# Patient Record
Sex: FEMALE | Race: WHITE | Employment: UNEMPLOYED | URBAN - METROPOLITAN AREA
[De-identification: names, ages, dates, MRNs, and addresses within clinical notes are randomized per-mention and may not be internally consistent; named-entity substitution may affect disease eponyms.]

---

## 2017-05-31 ENCOUNTER — HOSPITAL ENCOUNTER (OUTPATIENT)
Dept: GENERAL RADIOLOGY | Age: 47
Discharge: HOME | End: 2017-05-31
Attending: OBSTETRICS & GYNECOLOGY
Payer: COMMERCIAL

## 2017-05-31 ENCOUNTER — HOSPITAL ENCOUNTER (OUTPATIENT)
Dept: ULTRASOUND IMAGING | Age: 47
Discharge: HOME | End: 2017-05-31
Attending: OBSTETRICS & GYNECOLOGY
Payer: COMMERCIAL

## 2017-05-31 ENCOUNTER — HOSPITAL ENCOUNTER (OUTPATIENT)
Dept: MAMMOGRAPHY | Age: 47
Discharge: HOME | End: 2017-05-31
Attending: OBSTETRICS & GYNECOLOGY
Payer: COMMERCIAL

## 2017-05-31 DIAGNOSIS — Z12.31 VISIT FOR SCREENING MAMMOGRAM: ICD-10-CM

## 2017-05-31 DIAGNOSIS — N63.0 LUMP OR MASS IN BREAST: ICD-10-CM

## 2017-05-31 PROCEDURE — 77066 DX MAMMO INCL CAD BI: CPT

## 2017-05-31 PROCEDURE — 76642 ULTRASOUND BREAST LIMITED: CPT

## 2017-05-31 PROCEDURE — 77080 DXA BONE DENSITY AXIAL: CPT

## 2017-06-21 ENCOUNTER — HOSPITAL ENCOUNTER (OUTPATIENT)
Dept: ULTRASOUND IMAGING | Age: 47
Discharge: HOME | End: 2017-06-21
Attending: OBSTETRICS & GYNECOLOGY

## 2017-06-21 ENCOUNTER — APPOINTMENT (OUTPATIENT)
Dept: MAMMOGRAPHY | Age: 47
End: 2017-06-21
Attending: OBSTETRICS & GYNECOLOGY

## 2017-06-21 DIAGNOSIS — R92.8 ABNORMAL RADIOGRAPHIC FINDINGS IN SPECIMEN FROM FEMALE BREAST: ICD-10-CM

## 2017-06-21 RX ORDER — LIDOCAINE HYDROCHLORIDE AND EPINEPHRINE 10; 10 MG/ML; UG/ML
10 INJECTION, SOLUTION INFILTRATION; PERINEURAL
Status: DISPENSED | OUTPATIENT
Start: 2017-06-21 | End: 2017-06-21

## 2017-06-21 RX ORDER — LIDOCAINE HYDROCHLORIDE 10 MG/ML
10 INJECTION INFILTRATION; PERINEURAL
Status: DISPENSED | OUTPATIENT
Start: 2017-06-21 | End: 2017-06-21

## 2017-06-23 ENCOUNTER — HOSPITAL ENCOUNTER (OUTPATIENT)
Dept: MAMMOGRAPHY | Age: 47
Discharge: HOME | End: 2017-06-23
Attending: OBSTETRICS & GYNECOLOGY
Payer: COMMERCIAL

## 2017-06-23 ENCOUNTER — HOSPITAL ENCOUNTER (OUTPATIENT)
Dept: ULTRASOUND IMAGING | Age: 47
Discharge: HOME | End: 2017-06-23
Attending: OBSTETRICS & GYNECOLOGY
Payer: COMMERCIAL

## 2017-06-23 DIAGNOSIS — R92.8 ABNORMAL RADIOGRAPHIC FINDINGS IN SPECIMEN FROM FEMALE BREAST: ICD-10-CM

## 2017-06-23 PROCEDURE — 77030020003 US BX BREAST RT 1ST LESION W/CLIP AND SPECIMEN

## 2017-06-23 PROCEDURE — 77065 DX MAMMO INCL CAD UNI: CPT

## 2017-06-23 PROCEDURE — 77030020003 US BX BREAST VAC RT 1ST LESION W/CLIP AND SPECIMEN

## 2017-06-23 PROCEDURE — 74011000250 HC RX REV CODE- 250: Performed by: RADIOLOGY

## 2017-06-23 PROCEDURE — 88305 TISSUE EXAM BY PATHOLOGIST: CPT | Performed by: OBSTETRICS & GYNECOLOGY

## 2017-06-23 RX ORDER — LIDOCAINE HYDROCHLORIDE 10 MG/ML
10 INJECTION INFILTRATION; PERINEURAL
Status: COMPLETED | OUTPATIENT
Start: 2017-06-23 | End: 2017-06-23

## 2017-06-23 RX ORDER — LIDOCAINE HYDROCHLORIDE AND EPINEPHRINE 10; 10 MG/ML; UG/ML
10 INJECTION, SOLUTION INFILTRATION; PERINEURAL
Status: COMPLETED | OUTPATIENT
Start: 2017-06-23 | End: 2017-06-23

## 2017-06-23 RX ADMIN — LIDOCAINE HYDROCHLORIDE,EPINEPHRINE BITARTRATE 10 ML: 10; .01 INJECTION, SOLUTION INFILTRATION; PERINEURAL at 14:24

## 2017-06-23 RX ADMIN — LIDOCAINE HYDROCHLORIDE 10 ML: 10 INJECTION, SOLUTION INFILTRATION; PERINEURAL at 13:55

## 2017-08-15 ENCOUNTER — HOSPITAL ENCOUNTER (EMERGENCY)
Age: 47
Discharge: STILL ADMITTED OR ADMINISTRATIVE | End: 2017-08-15
Attending: EMERGENCY MEDICINE
Payer: MEDICAID

## 2017-08-15 PROCEDURE — 75810000275 HC EMERGENCY DEPT VISIT NO LEVEL OF CARE

## 2017-08-15 NOTE — ED PROVIDER NOTES
HPI     No past medical history on file. No past surgical history on file. No family history on file. Social History     Social History    Marital status: SINGLE     Spouse name: N/A    Number of children: N/A    Years of education: N/A     Occupational History    Not on file. Social History Main Topics    Smoking status: Not on file    Smokeless tobacco: Not on file    Alcohol use Not on file    Drug use: Not on file    Sexual activity: Not on file     Other Topics Concern    Not on file     Social History Narrative         ALLERGIES: Review of patient's allergies indicates not on file. Review of Systems    There were no vitals filed for this visit.          Physical Exam     MDM  ED Course       Procedures

## 2018-03-16 ENCOUNTER — HOSPITAL ENCOUNTER (EMERGENCY)
Age: 48
Discharge: STILL ADMITTED OR ADMINISTRATIVE | End: 2018-03-16
Attending: EMERGENCY MEDICINE
Payer: MEDICAID

## 2018-03-16 PROCEDURE — 75810000275 HC EMERGENCY DEPT VISIT NO LEVEL OF CARE

## 2018-06-12 ENCOUNTER — HOSPITAL ENCOUNTER (OUTPATIENT)
Dept: MAMMOGRAPHY | Age: 48
Discharge: HOME | End: 2018-06-12
Attending: OBSTETRICS & GYNECOLOGY
Payer: COMMERCIAL

## 2018-06-12 DIAGNOSIS — Z12.31 VISIT FOR SCREENING MAMMOGRAM: ICD-10-CM

## 2018-06-12 PROCEDURE — 77067 SCR MAMMO BI INCL CAD: CPT

## 2019-02-19 ENCOUNTER — HOSPITAL ENCOUNTER (EMERGENCY)
Age: 49
Discharge: HOME | End: 2019-02-19
Attending: EMERGENCY MEDICINE
Payer: MEDICAID

## 2019-02-19 VITALS
HEIGHT: 65 IN | RESPIRATION RATE: 17 BRPM | OXYGEN SATURATION: 100 % | BODY MASS INDEX: 19.83 KG/M2 | WEIGHT: 119 LBS | DIASTOLIC BLOOD PRESSURE: 70 MMHG | TEMPERATURE: 98.1 F | SYSTOLIC BLOOD PRESSURE: 125 MMHG | HEART RATE: 128 BPM

## 2019-02-19 DIAGNOSIS — N39.0 ACUTE UTI: Primary | ICD-10-CM

## 2019-02-19 DIAGNOSIS — R79.89 ELEVATED LFTS: ICD-10-CM

## 2019-02-19 LAB
ALBUMIN SERPL-MCNC: 4.7 G/DL (ref 3.4–5)
ALBUMIN/GLOB SERPL: 1.6 {RATIO} (ref 0.8–1.7)
ALP SERPL-CCNC: 162 U/L (ref 45–117)
ALT SERPL-CCNC: 235 U/L (ref 13–56)
ANION GAP SERPL CALC-SCNC: 12 MMOL/L (ref 3–18)
APPEARANCE UR: ABNORMAL
AST SERPL-CCNC: 210 U/L (ref 15–37)
BACTERIA URNS QL MICRO: ABNORMAL /HPF
BASOPHILS # BLD: 0 K/UL (ref 0–0.1)
BASOPHILS NFR BLD: 1 % (ref 0–2)
BILIRUB SERPL-MCNC: 1.3 MG/DL (ref 0.2–1)
BILIRUB UR QL: ABNORMAL
BUN SERPL-MCNC: 10 MG/DL (ref 7–18)
BUN/CREAT SERPL: 16 (ref 12–20)
CALCIUM SERPL-MCNC: 10.1 MG/DL (ref 8.5–10.1)
CHLORIDE SERPL-SCNC: 96 MMOL/L (ref 100–108)
CO2 SERPL-SCNC: 27 MMOL/L (ref 21–32)
COLOR UR: ABNORMAL
CREAT SERPL-MCNC: 0.61 MG/DL (ref 0.6–1.3)
DIFFERENTIAL METHOD BLD: ABNORMAL
EOSINOPHIL # BLD: 0 K/UL (ref 0–0.4)
EOSINOPHIL NFR BLD: 0 % (ref 0–5)
EPITH CASTS URNS QL MICRO: ABNORMAL /LPF (ref 0–5)
ERYTHROCYTE [DISTWIDTH] IN BLOOD BY AUTOMATED COUNT: 13.2 % (ref 11.6–14.5)
GLOBULIN SER CALC-MCNC: 2.9 G/DL (ref 2–4)
GLUCOSE SERPL-MCNC: 112 MG/DL (ref 74–99)
GLUCOSE UR STRIP.AUTO-MCNC: NEGATIVE MG/DL
HCT VFR BLD AUTO: 39.7 % (ref 35–45)
HGB BLD-MCNC: 13.8 G/DL (ref 12–16)
HGB UR QL STRIP: ABNORMAL
HYALINE CASTS URNS QL MICRO: ABNORMAL /LPF (ref 0–2)
KETONES UR QL STRIP.AUTO: 160 MG/DL
LACTATE BLD-SCNC: 1.75 MMOL/L (ref 0.4–2)
LEUKOCYTE ESTERASE UR QL STRIP.AUTO: ABNORMAL
LYMPHOCYTES # BLD: 0.7 K/UL (ref 0.9–3.6)
LYMPHOCYTES NFR BLD: 13 % (ref 21–52)
MCH RBC QN AUTO: 36.4 PG (ref 24–34)
MCHC RBC AUTO-ENTMCNC: 34.8 G/DL (ref 31–37)
MCV RBC AUTO: 104.7 FL (ref 74–97)
MONOCYTES # BLD: 0.4 K/UL (ref 0.05–1.2)
MONOCYTES NFR BLD: 7 % (ref 3–10)
MUCOUS THREADS URNS QL MICRO: ABNORMAL /LPF
NEUTS SEG # BLD: 4.4 K/UL (ref 1.8–8)
NEUTS SEG NFR BLD: 79 % (ref 40–73)
NITRITE UR QL STRIP.AUTO: POSITIVE
PH UR STRIP: 7 [PH] (ref 5–8)
PLATELET # BLD AUTO: 132 K/UL (ref 135–420)
PMV BLD AUTO: 9.8 FL (ref 9.2–11.8)
POTASSIUM SERPL-SCNC: 4.4 MMOL/L (ref 3.5–5.5)
PROT SERPL-MCNC: 7.6 G/DL (ref 6.4–8.2)
PROT UR STRIP-MCNC: >300 MG/DL
RBC # BLD AUTO: 3.79 M/UL (ref 4.2–5.3)
RBC #/AREA URNS HPF: ABNORMAL /HPF (ref 0–5)
SODIUM SERPL-SCNC: 135 MMOL/L (ref 136–145)
SP GR UR REFRACTOMETRY: 1.02 (ref 1–1.03)
UROBILINOGEN UR QL STRIP.AUTO: 2 EU/DL (ref 0.2–1)
WBC # BLD AUTO: 5.5 K/UL (ref 4.6–13.2)
WBC URNS QL MICRO: ABNORMAL /HPF (ref 0–4)

## 2019-02-19 PROCEDURE — 87086 URINE CULTURE/COLONY COUNT: CPT

## 2019-02-19 PROCEDURE — 80053 COMPREHEN METABOLIC PANEL: CPT

## 2019-02-19 PROCEDURE — 96361 HYDRATE IV INFUSION ADD-ON: CPT

## 2019-02-19 PROCEDURE — 99284 EMERGENCY DEPT VISIT MOD MDM: CPT

## 2019-02-19 PROCEDURE — 74011250636 HC RX REV CODE- 250/636: Performed by: EMERGENCY MEDICINE

## 2019-02-19 PROCEDURE — 96374 THER/PROPH/DIAG INJ IV PUSH: CPT

## 2019-02-19 PROCEDURE — 74011250637 HC RX REV CODE- 250/637: Performed by: EMERGENCY MEDICINE

## 2019-02-19 PROCEDURE — 85025 COMPLETE CBC W/AUTO DIFF WBC: CPT

## 2019-02-19 PROCEDURE — 83605 ASSAY OF LACTIC ACID: CPT

## 2019-02-19 PROCEDURE — 87040 BLOOD CULTURE FOR BACTERIA: CPT

## 2019-02-19 PROCEDURE — 81001 URINALYSIS AUTO W/SCOPE: CPT

## 2019-02-19 RX ORDER — ONDANSETRON 2 MG/ML
4 INJECTION INTRAMUSCULAR; INTRAVENOUS
Status: COMPLETED | OUTPATIENT
Start: 2019-02-19 | End: 2019-02-19

## 2019-02-19 RX ORDER — GLUCOSAMINE/CHONDR SU A SOD 750-600 MG
TABLET ORAL
COMMUNITY
End: 2019-04-05

## 2019-02-19 RX ORDER — CEPHALEXIN 250 MG/1
500 CAPSULE ORAL
Status: COMPLETED | OUTPATIENT
Start: 2019-02-19 | End: 2019-02-19

## 2019-02-19 RX ORDER — ONDANSETRON 4 MG/1
TABLET, ORALLY DISINTEGRATING ORAL
Qty: 10 TAB | Refills: 0 | Status: SHIPPED | OUTPATIENT
Start: 2019-02-19 | End: 2019-04-05

## 2019-02-19 RX ORDER — CEPHALEXIN 500 MG/1
500 CAPSULE ORAL 2 TIMES DAILY
Qty: 14 CAP | Refills: 0 | Status: SHIPPED | OUTPATIENT
Start: 2019-02-19 | End: 2019-02-26

## 2019-02-19 RX ADMIN — ONDANSETRON 4 MG: 2 INJECTION INTRAMUSCULAR; INTRAVENOUS at 12:22

## 2019-02-19 RX ADMIN — SODIUM CHLORIDE 1000 ML: 900 INJECTION, SOLUTION INTRAVENOUS at 11:04

## 2019-02-19 RX ADMIN — CEPHALEXIN 500 MG: 250 CAPSULE ORAL at 12:22

## 2019-02-19 NOTE — ED PROVIDER NOTES
EMERGENCY DEPARTMENT HISTORY AND PHYSICAL EXAM 
 
10:43 AM 
 
 
Date: 2/19/2019 Patient Name: Sujit Hunter History of Presenting Illness Chief Complaint Patient presents with  Urinary Pain  Urinary Frequency History Provided By: Patient Additional History (Context): Sujit Hunter is a 44 y.o. female with skin cancer who presents with intermittent dysuria for the past 2 weeks. Associated sx are  vomiting (past few days), abd pain, and back pain. Denies hematuria, diarrhea, and fever. She has tried drinking water and juice with no relief of sx. She has had bladder infections in the past. No meds everyday. Has had a C section. Smokes 1 pack a day and drinks a few times a week. She appears shaky. PCP: None Chief Complaint: dysuria Duration:  2 weeks Timing:  Intermittent Location:  Quality: Burning Severity: 9 out of 10 Modifying Factors: She has tried drinking water and Juice with no relief of sx. Associated Symptoms: vomiting (past few days), abd pain, and back pain. Denies hematuria, diarrhea, and fever. Current Facility-Administered Medications Medication Dose Route Frequency Provider Last Rate Last Dose  sodium chloride 0.9 % bolus infusion 1,000 mL  1,000 mL IntraVENous ONCE Negra Tam DO 1,000 mL/hr at 02/19/19 1104 1,000 mL at 02/19/19 1104  cephALEXin (KEFLEX) capsule 500 mg  500 mg Oral NOW Shelley Ahumada DO      
 
Current Outpatient Medications Medication Sig Dispense Refill  Biotin 2,500 mcg cap Take  by mouth.  cephALEXin (KEFLEX) 500 mg capsule Take 1 Cap by mouth two (2) times a day for 7 days. 14 Cap 0 Past History Past Medical History: 
History reviewed. No pertinent past medical history. Past Surgical History: 
Past Surgical History:  
Procedure Laterality Date  HX BREAST BIOPSY Right 06/23/2017 Benign Family History: 
History reviewed. No pertinent family history. Social History: Social History Tobacco Use  Smoking status: Current Every Day Smoker  Smokeless tobacco: Never Used Substance Use Topics  Alcohol use: Not on file  Drug use: Not on file Allergies: Allergies Allergen Reactions  Bactrim [Sulfamethoprim] Rash Review of Systems Review of Systems Constitutional: Negative for fever. Gastrointestinal: Positive for abdominal pain and vomiting. Negative for diarrhea. Genitourinary: Positive for dysuria. Negative for hematuria. Musculoskeletal: Positive for back pain. All other systems reviewed and are negative. Physical Exam  
 
Visit Vitals /78 Pulse (!) 128 Temp 98.1 °F (36.7 °C) Resp 17 Ht 5' 5\" (1.651 m) Wt 54 kg (119 lb) SpO2 96% BMI 19.80 kg/m² Physical Exam  
Constitutional: She is oriented to person, place, and time. She appears well-developed and well-nourished. tremulous HENT:  
Head: Normocephalic and atraumatic. Neck: Neck supple. No JVD present. Cardiovascular: Regular rhythm. Tachycardia present. Pulmonary/Chest: Effort normal and breath sounds normal. No respiratory distress. Abdominal: Soft. She exhibits no distension. There is tenderness in the suprapubic area. There is no rebound, no guarding and no CVA tenderness. Musculoskeletal: She exhibits no edema. No joint tenderness Neurological: She is alert and oriented to person, place, and time. Skin: Skin is warm and dry. No erythema. Psychiatric: Judgment normal.  
 
 
 
Diagnostic Study Results Labs - Recent Results (from the past 12 hour(s)) URINALYSIS W/ RFLX MICROSCOPIC Collection Time: 02/19/19 10:22 AM  
Result Value Ref Range Color DARK YELLOW Appearance HAZY Specific gravity 1.020 1.003 - 1.030    
 pH (UA) 7.0 5.0 - 8.0 Protein >300 (A) NEG mg/dL Glucose NEGATIVE  NEG mg/dL Ketone 160 (A) NEG mg/dL Bilirubin LARGE (A) NEG  Blood MODERATE (A) NEG    
 Urobilinogen 2.0 (H) 0.2 - 1.0 EU/dL Nitrites POSITIVE (A) NEG Leukocyte Esterase SMALL (A) NEG URINE MICROSCOPIC ONLY Collection Time: 02/19/19 10:22 AM  
Result Value Ref Range WBC 21 to 35 0 - 4 /hpf  
 RBC 11 to 20 0 - 5 /hpf Epithelial cells 4+ 0 - 5 /lpf Bacteria 3+ (A) NEG /hpf Mucus 2+ (A) NEG /lpf Hyaline cast 4 to 10 0 - 2 /lpf  
CBC WITH AUTOMATED DIFF Collection Time: 02/19/19 11:00 AM  
Result Value Ref Range WBC 5.5 4.6 - 13.2 K/uL  
 RBC 3.79 (L) 4.20 - 5.30 M/uL  
 HGB 13.8 12.0 - 16.0 g/dL HCT 39.7 35.0 - 45.0 % .7 (H) 74.0 - 97.0 FL  
 MCH 36.4 (H) 24.0 - 34.0 PG  
 MCHC 34.8 31.0 - 37.0 g/dL  
 RDW 13.2 11.6 - 14.5 % PLATELET 125 (L) 471 - 420 K/uL MPV 9.8 9.2 - 11.8 FL  
 NEUTROPHILS 79 (H) 40 - 73 % LYMPHOCYTES 13 (L) 21 - 52 % MONOCYTES 7 3 - 10 % EOSINOPHILS 0 0 - 5 % BASOPHILS 1 0 - 2 %  
 ABS. NEUTROPHILS 4.4 1.8 - 8.0 K/UL  
 ABS. LYMPHOCYTES 0.7 (L) 0.9 - 3.6 K/UL  
 ABS. MONOCYTES 0.4 0.05 - 1.2 K/UL  
 ABS. EOSINOPHILS 0.0 0.0 - 0.4 K/UL  
 ABS. BASOPHILS 0.0 0.0 - 0.1 K/UL  
 DF AUTOMATED METABOLIC PANEL, COMPREHENSIVE Collection Time: 02/19/19 11:00 AM  
Result Value Ref Range Sodium 135 (L) 136 - 145 mmol/L Potassium 4.4 3.5 - 5.5 mmol/L Chloride 96 (L) 100 - 108 mmol/L  
 CO2 27 21 - 32 mmol/L Anion gap 12 3.0 - 18 mmol/L Glucose 112 (H) 74 - 99 mg/dL BUN 10 7.0 - 18 MG/DL Creatinine 0.61 0.6 - 1.3 MG/DL  
 BUN/Creatinine ratio 16 12 - 20 GFR est AA >60 >60 ml/min/1.73m2 GFR est non-AA >60 >60 ml/min/1.73m2 Calcium 10.1 8.5 - 10.1 MG/DL Bilirubin, total 1.3 (H) 0.2 - 1.0 MG/DL  
 ALT (SGPT) 235 (H) 13 - 56 U/L  
 AST (SGOT) 210 (H) 15 - 37 U/L Alk. phosphatase 162 (H) 45 - 117 U/L Protein, total 7.6 6.4 - 8.2 g/dL Albumin 4.7 3.4 - 5.0 g/dL Globulin 2.9 2.0 - 4.0 g/dL A-G Ratio 1.6 0.8 - 1.7 POC LACTIC ACID  Collection Time: 02/19/19 11:09 AM  
 Result Value Ref Range Lactic Acid (POC) 1.75 0.40 - 2.00 mmol/L Radiologic Studies - No orders to display Medical Decision Making It should be noted that ICarina DO will be the provider of record for this patient. I reviewed the vital signs, available nursing notes, past medical history, past surgical history, family history and social history. Vital Signs-Reviewed the patient's vital signs. Pulse Oximetry Analysis -  98% on room air Records Reviewed: Nursing Notes (Time of Review: 10:43 AM) ED Course: Progress Notes, Reevaluation, and Consults: 
 
Provider Notes (Medical Decision Making): 43 y/o female presents with dysuria and abd pain, noted tachycardia, appears anxious but will screen for sepsis with lactate, cultures, labs. Mild suprapubic tenderness, will consider imaging if workup neg. abd soft, non-surgical.  
 
11:48 AM 
Discussed results with pt, noted elevated LFTs. Discussed importance of pcp follow up for repeat testing. Remainder of workup unremarkable other than uti, will treat. Diagnosis Clinical Impression: 1. Acute UTI 2. Elevated LFTs Disposition: discharged Follow-up Information Follow up With Specialties Details Why Contact Info Barbara Ro MD Family Practice, Internal Medicine Go [place] 2 [place] [id number] [facility] [phone] [place] [id number] [phone] Dudley Recio MD Internal Medicine Go in 2 days For Follow up [id number] [facility] [address] [place] [id number] [phone] [facility] EMERGENCY DEPT Emergency Medicine Go to As needed, If symptoms worsen [address] 
[phone] Medication List  
  
START taking these medications   
cephALEXin 500 mg capsule Commonly known as:  Wyatt Irwin Take 1 Cap by mouth two (2) times a day for 7 days. ASK your doctor about these medications Biotin 2,500 mcg Cap Where to Get Your Medications These medications were sent to Nataly Bergeron at [facility] [address], [address] [place] [address], [facility] [id number] Phone:  [phone] · cephALEXin 500 mg capsule 
  
 
_______________________________ Scribe Attestation Reyes Doheny acting as a scribe for and in the presence of Joby Cobian DO February 19, 2019 at 10:48 AM 
    
Provider Attestation:     
I personally performed the services described in the documentation, reviewed the documentation, as recorded by the scribe in my presence, and it accurately and completely records my words and actions. February 19, 2019 at 10:48 AM - Joby Cobian DO   
 
 
_______________________________

## 2019-02-19 NOTE — DISCHARGE INSTRUCTIONS
Patient Education        Liver Function Tests: About These Tests  What is it? Liver function tests check how well your liver is working. Some tests measure the amount of certain enzymes in your blood to check whether the liver is damaged or inflamed. Other tests measure how well the liver can make important proteins or clear waste products from the body. Your doctor will use the test results to help look for certain conditions, such as hepatitis, cirrhosis, or gallbladder problems. Test results that are not normal do not always mean there is a problem with your liver. Your doctor can determine if there is a problem based on your results. The tests may include:  · Alkaline phosphatase (ALP). This test measures the amount of the enzyme ALP in your blood. · Total protein. A total serum protein test measures the amounts of two major groups of proteins in your blood (albumin and globulin) and the total amount of protein. · Bilirubin. This test measures the amount of bilirubin in your blood. When bilirubin levels are high, the skin and whites of the eyes may appear yellow (jaundice). This may be caused by liver disease. · Aspartate aminotransferase (AST) and alanine aminotransferase (ALT). These tests measure the amount of the enzymes AST and ALT in your blood. (Aminotransferase is also known as a transaminase. High levels may be called transaminitis.)  Why is this test done? Liver function tests check how well your liver is working. Some tests help show whether your liver is damaged or inflamed. Your doctor may order liver function tests if you have symptoms of liver disease. These tests also may be done to see how well a treatment for liver disease is working. How can you prepare for the test?  In general, you do not need to prepare before having these tests. Your doctor may give you some specific instructions to prepare.   What happens during the test?  A health professional takes a sample of your blood.  What happens after the test?  You will probably be able to go home right away. When should you call for help? Watch closely for changes in your health, and be sure to contact your doctor if you have any problems. Follow-up care is a key part of your treatment and safety. Be sure to make and go to all appointments, and call your doctor if you are having problems. It's also a good idea to keep a list of the medicines you take. Ask your doctor when you can expect to have your test results. Where can you learn more? Go to http://juan-nayla.info/. Enter L069 in the search box to learn more about \"Liver Function Tests: About These Tests. \"  Current as of: June 25, 2018  Content Version: 11.9  © 2006-2018 [employer]. Care instructions adapted under license by [employer] (which disclaims liability or warranty for this information). If you have questions about a medical condition or this instruction, always ask your healthcare professional. [place] [address] any warranty or liability for your use of this information. Patient Education        Urinary Tract Infection in Women: Care Instructions  Your Care Instructions    A urinary tract infection, or UTI, is a general term for an infection anywhere between the kidneys and the urethra (where urine comes out). Most UTIs are bladder infections. They often cause pain or burning when you urinate. UTIs are caused by bacteria and can be cured with antibiotics. Be sure to complete your treatment so that the infection goes away. Follow-up care is a key part of your treatment and safety. Be sure to make and go to all appointments, and call your doctor if you are having problems. It's also a good idea to know your test results and keep a list of the medicines you take. How can you care for yourself at home? · Take your antibiotics as directed. Do not stop taking them just because you feel better.  You need to take the full course of antibiotics. · Drink extra water and other fluids for the next day or two. This may help wash out the bacteria that are causing the infection. (If you have kidney, heart, or liver disease and have to limit fluids, talk with your doctor before you increase your fluid intake.)  · Avoid drinks that are carbonated or have caffeine. They can irritate the bladder. · Urinate often. Try to empty your bladder each time. · To relieve pain, take a hot bath or lay a heating pad set on low over your lower belly or genital area. Never go to sleep with a heating pad in place. To prevent UTIs  · Drink plenty of water each day. This helps you urinate often, which clears bacteria from your system. (If you have kidney, heart, or liver disease and have to limit fluids, talk with your doctor before you increase your fluid intake.)  · Urinate when you need to. · Urinate right after you have sex. · Change sanitary pads often. · Avoid douches, bubble baths, feminine hygiene sprays, and other feminine hygiene products that have deodorants. · After going to the bathroom, wipe from front to back. When should you call for help? Call your doctor now or seek immediate medical care if:    · Symptoms such as fever, chills, nausea, or vomiting get worse or appear for the first time.     · You have new pain in your back just below your rib cage. This is called flank pain.     · There is new blood or pus in your urine.     · You have any problems with your antibiotic medicine.    Watch closely for changes in your health, and be sure to contact your doctor if:    · You are not getting better after taking an antibiotic for 2 days.     · Your symptoms go away but then come back. Where can you learn more? Go to http://juan-nayla.info/. Enter T388 in the search box to learn more about \"Urinary Tract Infection in Women: Care Instructions. \"  Current as of: March 20, 2018  Content Version: 11.9  © 5644-0881 [employer]. Care instructions adapted under license by [employer] (which disclaims liability or warranty for this information). If you have questions about a medical condition or this instruction, always ask your healthcare professional. [address] any warranty or liability for your use of this information.

## 2019-02-19 NOTE — ED TRIAGE NOTES
Frequency pain and urgency voiding. X 2 weeks. tx w/ OTC meds w/ no relief. Took motrin and vomite brown emesis

## 2019-02-21 LAB
BACTERIA SPEC CULT: NORMAL
SERVICE CMNT-IMP: NORMAL

## 2019-02-25 LAB
BACTERIA SPEC CULT: NORMAL
BACTERIA SPEC CULT: NORMAL
SERVICE CMNT-IMP: NORMAL
SERVICE CMNT-IMP: NORMAL

## 2019-04-05 ENCOUNTER — OFFICE VISIT (OUTPATIENT)
Dept: FAMILY MEDICINE CLINIC | Age: 49
End: 2019-04-05

## 2019-04-05 VITALS
DIASTOLIC BLOOD PRESSURE: 92 MMHG | BODY MASS INDEX: 18.03 KG/M2 | HEIGHT: 65 IN | TEMPERATURE: 97.9 F | SYSTOLIC BLOOD PRESSURE: 145 MMHG | OXYGEN SATURATION: 98 % | RESPIRATION RATE: 20 BRPM | HEART RATE: 121 BPM | WEIGHT: 108.2 LBS

## 2019-04-05 DIAGNOSIS — F10.10 ALCOHOL USE DISORDER, MILD, ABUSE: ICD-10-CM

## 2019-04-05 DIAGNOSIS — F17.200 TOBACCO USE DISORDER: ICD-10-CM

## 2019-04-05 DIAGNOSIS — M79.675 PAIN OF LEFT GREAT TOE: Primary | ICD-10-CM

## 2019-04-05 DIAGNOSIS — Z13.6 ENCOUNTER FOR LIPID SCREENING FOR CARDIOVASCULAR DISEASE: ICD-10-CM

## 2019-04-05 DIAGNOSIS — Z13.220 ENCOUNTER FOR LIPID SCREENING FOR CARDIOVASCULAR DISEASE: ICD-10-CM

## 2019-04-05 DIAGNOSIS — Z13.1 SCREENING FOR DIABETES MELLITUS: ICD-10-CM

## 2019-04-05 RX ORDER — NAPROXEN 500 MG/1
500 TABLET ORAL 2 TIMES DAILY WITH MEALS
Qty: 14 TAB | Refills: 0 | Status: SHIPPED | OUTPATIENT
Start: 2019-04-05 | End: 2019-04-12

## 2019-04-05 RX ORDER — IBUPROFEN 200 MG
1 TABLET ORAL EVERY 24 HOURS
Qty: 30 PATCH | Refills: 0 | Status: SHIPPED | OUTPATIENT
Start: 2019-04-05 | End: 2019-12-10

## 2019-04-05 RX ORDER — VARENICLINE TARTRATE 25 MG
KIT ORAL
Qty: 1 DOSE PACK | Refills: 0 | Status: SHIPPED | OUTPATIENT
Start: 2019-04-05 | End: 2019-10-02 | Stop reason: SDUPTHER

## 2019-04-05 RX ORDER — PAROXETINE 7.5 MG/1
CAPSULE ORAL
COMMUNITY
End: 2019-04-05

## 2019-04-05 NOTE — PROGRESS NOTES
History of Present Illness Surya Fisher is a 44 y.o. female who presents today for management of 
 
Chief Complaint Patient presents with [facility] Establish Care  Toe Pain Patient is here to establish care. Previous PCP: none Foot pain Patient complains of left foot pain, localized to the great toe. Onset of the symptoms was 2 weeks ago. Inciting event: none known. Current symptoms include bruising and pain. Aggravating symptoms: any weight bearing. Patient's overall course: stable. Patient has had no prior foot problems. Previous visits for this problem: none. Evaluation to date: none. Treatment to date: none. Past Medical History Past Medical History:  
Diagnosis Date  Fibroadenoma of breast, right  Malignant melanoma of forehead ([address] Surgical History Past Surgical History:  
Procedure Laterality Date  HX BREAST BIOPSY Right 2017 Benign  HX  SECTION Current Medications Current Outpatient Medications Medication Sig  varenicline (CHANTIX STARTING MONTH BOX) 0.5 mg (11)- 1 mg (42) DsPk Per Dose pack instructions  nicotine (NICODERM CQ) 21 mg/24 hr 1 Patch by TransDERmal route every twenty-four (24) hours.  naproxen (NAPROSYN) 500 mg tablet Take 1 Tab by mouth two (2) times daily (with meals) for 7 days. No current facility-administered medications for this visit. Allergies/Drug Reactions Allergies Allergen Reactions  Bactrim [Sulfamethoprim] Rash  Penicillins Rash and Other (comments) Family History Family History Problem Relation Age of Onset  Dementia Father  No Known Problems Sister  No Known Problems Brother Social History Social History Socioeconomic History  Marital status: LEGALLY  Spouse name: Not on file  Number of children: Not on file  Years of education: Not on file  Highest education level: Not on file Occupational History  Not on file Social Needs  Financial resource strain: Not on file  Food insecurity:  
  Worry: Not on file Inability: Not on file  Transportation needs:  
  Medical: Not on file Non-medical: Not on file Tobacco Use  Smoking status: Current Every Day Smoker Packs/day: 1.00 Years: 14.00 Pack years: 14.00 Types: Cigarettes  Smokeless tobacco: Never Used Substance and Sexual Activity  Alcohol use: Yes Frequency: 4 or more times a week  Drug use: Never  Sexual activity: Not Currently Lifestyle  Physical activity:  
  Days per week: Not on file Minutes per session: Not on file  Stress: Not on file Relationships  Social connections:  
  Talks on phone: Not on file Gets together: Not on file Attends Sikhism service: Not on file Active member of club or organization: Not on file Attends meetings of clubs or organizations: Not on file Relationship status: Not on file  Intimate partner violence:  
  Fear of current or ex partner: Not on file Emotionally abused: Not on file Physically abused: Not on file Forced sexual activity: Not on file Other Topics Concern  Not on file Social History Narrative  Not on file Health Maintenance Topic Date Due  Pneumococcal 0-64 years (1 of 1 - PPSV23) 01/27/1974  
 DTaP/Tdap/Td series (1 - Tdap) 01/27/1989  PAP AKA CERVICAL CYTOLOGY  01/27/1989  Shingrix Vaccine Age 50> (1 of 2) 01/27/2018  FOBT Q 1 YEAR AGE 50-75  01/27/2018  Influenza Age 5 to Adult  08/01/2019  BREAST CANCER SCRN MAMMOGRAM  06/12/2020  Bone Densitometry (Dexa) Screening  01/27/2033 Review of Systems Negative except as mentioned in HPI Physical Exam 
Vital signs:  
Vitals:  
 04/05/19 1248 BP: (!) 145/92 Pulse: (!) 121 Resp: 20 Temp: 97.9 °F (36.6 °C) TempSrc: Oral  
SpO2: 98% Weight: 108 lb 3.2 oz (49.1 kg) Height: 5' 5\" (1.651 m) General: alert, oriented, not in distress Head: scalp normal, atraumatic Extremities: (+) mild erythema over the tip of the left big toe, (+) TTP, no swelling, no foreign body or mass noted, DP pulses full and equal, no focal deformities, no edema Skin: no active skin lesions Laboratory/Tests: 
Component Latest Ref Rng & Units 2/19/2019 2/19/2019 2/19/2019 2/19/2019  
 
     11:00 AM 11:00 AM 10:22 AM 10:22 AM  
WBC 
    4.6 - 13.2 K/uL  5.5    
RBC 
    4.20 - 5.30 M/uL  3.79 (L) HGB 
    12.0 - 16.0 g/dL  13.8 HCT 
    35.0 - 45.0 %  39.7 MCV 
    74.0 - 97.0 FL  104.7 (H) MCH 
    24.0 - 34.0 PG  36.4 (H) MCHC 31.0 - 37.0 g/dL  34.8    
RDW 
    11.6 - 14.5 %  13.2 PLATELET 
    363 - 909 K/uL  132 (L) MPV 
    9.2 - 11.8 FL  9.8 NEUTROPHILS 
    40 - 73 %  79 (H) LYMPHOCYTES 
    21 - 52 %  13 (L) MONOCYTES 
    3 - 10 %  7 EOSINOPHILS 
    0 - 5 %  0    
BASOPHILS 
    0 - 2 %  1    
ABS. NEUTROPHILS 
    1.8 - 8.0 K/UL  4.4    
ABS. LYMPHOCYTES 
    0.9 - 3.6 K/UL  0.7 (L)    
ABS. MONOCYTES 
    0.05 - 1.2 K/UL  0.4    
ABS. EOSINOPHILS 
    0.0 - 0.4 K/UL  0.0    
ABS. BASOPHILS 
    0.0 - 0.1 K/UL  0.0    
DF AUTOMATED Sodium 136 - 145 mmol/L 135 (L) Potassium 3.5 - 5.5 mmol/L 4.4 Chloride 100 - 108 mmol/L 96 (L)     
CO2 
    21 - 32 mmol/L 27 Anion gap 3.0 - 18 mmol/L 12 Glucose 74 - 99 mg/dL 112 (H) BUN 
    7.0 - 18 MG/DL 10 Creatinine 
    0.6 - 1.3 MG/DL 0.61     
BUN/Creatinine ratio 12 - 20   16 GFR est AA 
    >60 ml/min/1.73m2 >60     
GFR est non-AA 
    >60 ml/min/1.73m2 >60 Calcium 8.5 - 10.1 MG/DL 10.1 Bilirubin, total 
    0.2 - 1.0 MG/DL 1.3 (H) ALT (SGPT) 13 - 56 U/L 235 (H) AST 
    15 - 37 U/L 210 (H) Alk. phosphatase 45 - 117 U/L 162 (H) Protein, total 
    6.4 - 8.2 g/dL 7.6 Albumin 3.4 - 5.0 g/dL 4.7 Globulin 2.0 - 4.0 g/dL 2.9 A-G Ratio 
    0.8 - 1.7   1.6 Color DARK YELLOW Appearance HAZY Specific gravity 1.003 - 1.030      1.020  
pH (UA) 
    5.0 - 8.0      7.0 Protein NEG mg/dL    >300 (A) Glucose NEG mg/dL    NEGATIVE Ketone NEG mg/dL    160 (A) Bilirubin NEG      LARGE (A) Blood NEG      MODERATE (A) Urobilinogen 0.2 - 1.0 EU/dL    2.0 (H) Nitrites NEG      POSITIVE (A) Leukocyte Esterase NEG      SMALL (A) WBC 
    0 - 4 /hpf   21 to 35 RBC 
    0 - 5 /hpf   11 to 20 Epithelial cells 0 - 5 /lpf   4+ Bacteria NEG /hpf   3+ (A) Mucus NEG /lpf   2+ (A) Hyaline cast 
    0 - 2 /lpf   4 to 10 Assessment/Plan: 1. Pain of left great toe 
- ? Gout vs cellulitis vs trauma 
- naproxen (NAPROSYN) 500 mg tablet; Take 1 Tab by mouth two (2) times daily (with meals) for 7 days. Dispense: 14 Tab; Refill: 0 
- XR FOOT LT MIN 3 V; Future 2. Tobacco use disorder 
- varenicline (CHANTIX STARTING MONTH BOX) 0.5 mg (11)- 1 mg (42) DsPk; Per Dose pack instructions  Dispense: 1 Dose Pack; Refill: 0 
- nicotine (NICODERM CQ) 21 mg/24 hr; 1 Patch by TransDERmal route every twenty-four (24) hours. Dispense: 30 Patch; Refill: 0 
 
3. Encounter for lipid screening for cardiovascular disease - LIPID PANEL; Future 4. Screening for diabetes mellitus 
- HEMOGLOBIN A1C WITH EAG; Future 5. Alcohol use disorder, mild 
- advised to limit alcohol to one glass of wine per day Follow-up and Dispositions · Return in about 1 month (around 5/3/2019) for annual physical exam. 
  
 
 
 
I have discussed the diagnosis with the patient and the intended plan as seen in the above orders. The patient has received an after-visit summary and questions were answered concerning future plans.   I have discussed medication side effects and warnings with the patient as well. I have reviewed the plan of care with the patient, accepted their input and they are in agreement with the treatment goals. Ketan Zimmer MD 
April 5, 2019

## 2019-04-05 NOTE — PROGRESS NOTES
1. Have you been to the ER, urgent care clinic since your last visit? Hospitalized since your last visit? No 
 
2. Have you seen or consulted any other health care providers outside of the [address] [place] [address] since your last visit? Include any pap smears or colon screening.  No

## 2019-04-07 LAB
CHOLEST SERPL-MCNC: 350 MG/DL (ref 100–199)
EST. AVERAGE GLUCOSE BLD GHB EST-MCNC: 80 MG/DL
HBA1C MFR BLD: 4.4 % (ref 4.8–5.6)
HDLC SERPL-MCNC: 181 MG/DL
INTERPRETATION, 910389: NORMAL
LDLC SERPL CALC-MCNC: 151 MG/DL (ref 0–99)
TRIGL SERPL-MCNC: 90 MG/DL (ref 0–149)
VLDLC SERPL CALC-MCNC: 18 MG/DL (ref 5–40)

## 2019-06-06 ENCOUNTER — TELEPHONE (OUTPATIENT)
Dept: FAMILY MEDICINE CLINIC | Age: 49
End: 2019-06-06

## 2019-06-06 DIAGNOSIS — E78.00 HYPERCHOLESTEROLEMIA: Primary | ICD-10-CM

## 2019-06-06 RX ORDER — ATORVASTATIN CALCIUM 20 MG/1
20 TABLET, FILM COATED ORAL DAILY
Qty: 90 TAB | Refills: 1 | Status: SHIPPED | OUTPATIENT
Start: 2019-06-06 | End: 2020-03-02 | Stop reason: SINTOL

## 2019-06-06 NOTE — TELEPHONE ENCOUNTER
Spoke with patient, patient advised that labs noted to be discussed at the time of her appointment. Pt states she is not feeling well and her gout is flaring. Per Dr. Edna Lennon, pt should take Lipitor 20 mg once daily and seek immediate attention for gout at urgent care. Nurse offered appt with another provider,pt declined by stating no she is happy with her current provider. Pt verbalized understanding, this encounter will be closed.

## 2019-06-07 ENCOUNTER — DOCUMENTATION ONLY (OUTPATIENT)
Dept: FAMILY MEDICINE CLINIC | Age: 49
End: 2019-06-07

## 2019-06-07 NOTE — PROGRESS NOTES
Patient did not arrive to their scheduled appointment on 6/6/19. No show letter #1 sent on 06/07/19. Thank you.

## 2019-07-15 ENCOUNTER — DOCUMENTATION ONLY (OUTPATIENT)
Dept: FAMILY MEDICINE CLINIC | Age: 49
End: 2019-07-15

## 2019-07-15 NOTE — PROGRESS NOTES
Patient did not arrive to their scheduled appointment on 7/12/19. No show letter #1 sent on 07/15/19. Thank you.

## 2019-07-24 ENCOUNTER — OFFICE VISIT (OUTPATIENT)
Dept: FAMILY MEDICINE CLINIC | Age: 49
End: 2019-07-24

## 2019-07-24 ENCOUNTER — HOSPITAL ENCOUNTER (OUTPATIENT)
Dept: LAB | Age: 49
Discharge: HOME | End: 2019-07-24

## 2019-07-24 VITALS
OXYGEN SATURATION: 99 % | TEMPERATURE: 98.5 F | BODY MASS INDEX: 17.93 KG/M2 | HEIGHT: 65 IN | WEIGHT: 107.6 LBS | HEART RATE: 85 BPM | SYSTOLIC BLOOD PRESSURE: 118 MMHG | DIASTOLIC BLOOD PRESSURE: 80 MMHG | RESPIRATION RATE: 20 BRPM

## 2019-07-24 DIAGNOSIS — M79.672 PAIN IN BOTH FEET: ICD-10-CM

## 2019-07-24 DIAGNOSIS — R74.8 ELEVATED LIVER ENZYMES: ICD-10-CM

## 2019-07-24 DIAGNOSIS — M79.2 NERVE PAIN: Primary | ICD-10-CM

## 2019-07-24 DIAGNOSIS — M79.2 NERVE PAIN: ICD-10-CM

## 2019-07-24 DIAGNOSIS — M79.671 PAIN IN BOTH FEET: ICD-10-CM

## 2019-07-24 DIAGNOSIS — D75.89 MACROCYTOSIS WITHOUT ANEMIA: ICD-10-CM

## 2019-07-24 DIAGNOSIS — R41.3 MEMORY LOSS: ICD-10-CM

## 2019-07-24 LAB — XX-LABCORP SPECIMEN COL,LCBCF: NORMAL

## 2019-07-24 PROCEDURE — 99001 SPECIMEN HANDLING PT-LAB: CPT

## 2019-07-24 NOTE — PROGRESS NOTES
1. Have you been to the ER, urgent care clinic since your last visit? Hospitalized since your last visit? No    2. Have you seen or consulted any other health care providers outside of the [address] [place] [address] since your last visit? Include any pap smears or colon screening.  No

## 2019-07-24 NOTE — PROGRESS NOTES
Subjective     Patient ID:  Johnnie Ortiz is a 44 y.o. ( 1970) female who presents for the following: Follow-up      HPI  Patient of Dr. Candy Blanton presents with multiple symptoms. Seen by Dr. Johana Castrejon on 19 for left great toe pain. Treated for possible gout with naproxen. Since then pain has spread to both feet and lower legs. Also started on Chantix and Lipitor which she took for 9 days. Stopped all medication at that point because of the leg and foot pain. Pain improved significantly after stopping the meds, but then started having a tic of the shoulders which occurs randomly, and prickling sensation mostly in the toes. Reporting \"hazy\" feeling and memory problems. Foot pain with weight bearing and balls of feet feel swollen. Low energy in the afternoon. Using topical creams and turmeric with little improvement. She reports past heavy use of alcohol. Her last drink was 2 months ago. Review of Systems   Constitutional: Negative for appetite change, diaphoresis, fatigue and unexpected weight change. Eyes: Negative for visual disturbance. Respiratory: Negative for cough, chest tightness and shortness of breath. Cardiovascular: Negative for chest pain, palpitations and leg swelling. Gastrointestinal: Negative for abdominal distention, abdominal pain, blood in stool, constipation, diarrhea, nausea, rectal pain and vomiting. Endocrine: Negative for polydipsia, polyphagia and polyuria. Genitourinary: Negative for decreased urine volume, dysuria and frequency. Musculoskeletal: Positive for arthralgias (Foot pain). Negative for joint swelling and myalgias. Skin: Negative for rash and wound. Neurological: Negative for dizziness, weakness, light-headedness, numbness and headaches. Psychiatric/Behavioral: Positive for decreased concentration (Memory problems). Negative for dysphoric mood and sleep disturbance. The patient is not nervous/anxious.          Past Medical History, Past Surgery History, Allergies, Social History, and Family History were reviewed and updated. Past Medical History:   Diagnosis Date    Fibroadenoma of breast, right     Malignant melanoma of forehead ([address]      Past Surgical History:   Procedure Laterality Date    HX BREAST BIOPSY Right 2017    Benign    HX  SECTION       Family History   Problem Relation Age of Onset    Dementia Father     No Known Problems Sister     No Known Problems Brother      Social History     Socioeconomic History    Marital status: LEGALLY      Spouse name: Not on file    Number of children: Not on file    Years of education: Not on file    Highest education level: Not on file   Occupational History    Not on file   Social Needs    Financial resource strain: Not on file    Food insecurity:     Worry: Not on file     Inability: Not on file    Transportation needs:     Medical: Not on file     Non-medical: Not on file   Tobacco Use    Smoking status: Current Every Day Smoker     Packs/day: 1.00     Years: 14.00     Pack years: 14.00     Types: Cigarettes    Smokeless tobacco: Never Used   Substance and Sexual Activity    Alcohol use: Not Currently     Frequency: 4 or more times a week     Comment: Last drink was 2019. Previously heavy drinker.     Drug use: Never    Sexual activity: Not Currently   Lifestyle    Physical activity:     Days per week: Not on file     Minutes per session: Not on file    Stress: Not on file   Relationships    Social connections:     Talks on phone: Not on file     Gets together: Not on file     Attends Religion service: Not on file     Active member of club or organization: Not on file     Attends meetings of clubs or organizations: Not on file     Relationship status: Not on file    Intimate partner violence:     Fear of current or ex partner: Not on file     Emotionally abused: Not on file     Physically abused: Not on file     Forced sexual activity: Not on file   Other Topics Concern    Not on file   Social History Narrative    Not on file     Allergies   Allergen Reactions    Bactrim [Sulfamethoprim] Rash    Penicillins Rash and Other (comments)     Current Outpatient Medications on File Prior to Visit   Medication Sig Dispense Refill    atorvastatin (LIPITOR) 20 mg tablet Take 1 Tab by mouth daily. 90 Tab 1    varenicline (CHANTIX STARTING MONTH BOX) 0.5 mg (11)- 1 mg (42) DsPk Per Dose pack instructions 1 Dose Pack 0    nicotine (NICODERM CQ) 21 mg/24 hr 1 Patch by TransDERmal route every twenty-four (24) hours. 30 Patch 0     No current facility-administered medications on file prior to visit. Objective     Visit Vitals  /80   Pulse 85   Temp 98.5 °F (36.9 °C) (Oral)   Resp 20   Ht 5' 5\" (1.651 m)   Wt 107 lb 9.6 oz (48.8 kg)   SpO2 99%   BMI 17.91 kg/m²     No LMP recorded. Patient is perimenopausal.    Physical Exam   Constitutional: She is oriented to person, place, and time. She appears well-developed and well-nourished. No distress. Eyes: Conjunctivae and EOM are normal. Pupils are equal, round, and reactive to light. Neck: Carotid bruit is not present. Cardiovascular: Normal rate, regular rhythm, normal heart sounds, intact distal pulses and normal pulses. No murmur heard. Pulmonary/Chest: Effort normal and breath sounds normal. No respiratory distress. Abdominal: Soft. Normal appearance and bowel sounds are normal. She exhibits no distension, no ascites and no mass. There is no hepatosplenomegaly. There is no tenderness. Musculoskeletal: She exhibits no edema. Generalized tenderness of the feet and ankles. No focal tenderness. Color, sensation, pulses, and capillary refill are normal.   Neurological: She is alert and oriented to person, place, and time. She has normal strength and normal reflexes. No sensory deficit. Coordination and gait normal.   Skin: Skin is warm and dry. No rash noted.  She is not diaphoretic. Psychiatric: She has a normal mood and affect. LABS   Component      Latest Ref Rng & Units 4/5/2019 4/5/2019 4/5/2019           1:20 PM  1:20 PM  1:20 PM   Cholesterol, total      100 - 199 mg/dL  350 (H)    Triglyceride      0 - 149 mg/dL  90    HDL Cholesterol      >39 mg/dL  181    VLDL, calculated      5 - 40 mg/dL  18    LDL, calculated      0 - 99 mg/dL  151 (H)    Hemoglobin A1c, (calculated)      4.8 - 5.6 %   4.4 (L)   Estimated average glucose      mg/dL   80   INTERPRETATION       Note       TESTS      Assessment and Plan     1. Nerve pain  Start with labs. Further plan after results.  - LYME AB, IGG & IGM BY WB; Future  - TSH 3RD GENERATION; Future  - T4, FREE; Future  - VITAMIN B12; Future  - VITAMIN D, 25 HYDROXY; Future  - METHYLMALONIC ACID; Future  - URIC ACID; Future    2. Memory loss    - TSH 3RD GENERATION; Future  - T4, FREE; Future    3. Pain in both feet    - URIC ACID; Future    4. Macrocytosis without anemia    - VITAMIN B12; Future  - METHYLMALONIC ACID; Future  - FOLATE; Future    5. Elevated liver enzymes    - METABOLIC PANEL, COMPREHENSIVE; Future      Follow-up and Dispositions    · Return for after lab results. Risks, benefits, and alternatives of the medications and treatment plan prescribed today were discussed, and patient expressed understanding. Printed after visit summary was given to patient and reviewed. All patient questions and concerns were addressed. Plan follow-up as discussed or as needed if any worsening symptoms or change in condition.            Signed electronically by Dayday Rosa DNP, PRATIK-BC

## 2019-07-25 LAB — SPECIMEN STATUS REPORT, ROLRST: NORMAL

## 2019-07-26 LAB
25(OH)D3+25(OH)D2 SERPL-MCNC: 14.5 NG/ML (ref 30–100)
ALBUMIN SERPL-MCNC: 4.8 G/DL (ref 3.5–5.5)
ALBUMIN/GLOB SERPL: 2.3 {RATIO} (ref 1.2–2.2)
ALP SERPL-CCNC: 73 IU/L (ref 39–117)
ALT SERPL-CCNC: 17 IU/L (ref 0–32)
AST SERPL-CCNC: 16 IU/L (ref 0–40)
B BURGDOR IGG PATRN SER IB-IMP: NEGATIVE
B BURGDOR IGM PATRN SER IB-IMP: NEGATIVE
B BURGDOR18KD IGG SER QL IB: PRESENT
B BURGDOR23KD IGG SER QL IB: ABNORMAL
B BURGDOR23KD IGM SER QL IB: ABNORMAL
B BURGDOR28KD IGG SER QL IB: ABNORMAL
B BURGDOR30KD IGG SER QL IB: ABNORMAL
B BURGDOR39KD IGG SER QL IB: ABNORMAL
B BURGDOR39KD IGM SER QL IB: ABNORMAL
B BURGDOR41KD IGG SER QL IB: ABNORMAL
B BURGDOR41KD IGM SER QL IB: ABNORMAL
B BURGDOR45KD IGG SER QL IB: ABNORMAL
B BURGDOR58KD IGG SER QL IB: ABNORMAL
B BURGDOR66KD IGG SER QL IB: ABNORMAL
B BURGDOR93KD IGG SER QL IB: ABNORMAL
BILIRUB SERPL-MCNC: 0.3 MG/DL (ref 0–1.2)
BUN SERPL-MCNC: 11 MG/DL (ref 6–24)
BUN/CREAT SERPL: 22 (ref 9–23)
CALCIUM SERPL-MCNC: 10.7 MG/DL (ref 8.7–10.2)
CHLORIDE SERPL-SCNC: 103 MMOL/L (ref 96–106)
CO2 SERPL-SCNC: 24 MMOL/L (ref 20–29)
CREAT SERPL-MCNC: 0.51 MG/DL (ref 0.57–1)
FOLATE SERPL-MCNC: 14.1 NG/ML
GLOBULIN SER CALC-MCNC: 2.1 G/DL (ref 1.5–4.5)
GLUCOSE SERPL-MCNC: 95 MG/DL (ref 65–99)
Lab: NORMAL
METHYLMALONATE SERPL-SCNC: 289 NMOL/L (ref 0–378)
POTASSIUM SERPL-SCNC: 4.4 MMOL/L (ref 3.5–5.2)
PROT SERPL-MCNC: 6.9 G/DL (ref 6–8.5)
SODIUM SERPL-SCNC: 139 MMOL/L (ref 134–144)
T4 FREE SERPL-MCNC: 1.02 NG/DL (ref 0.82–1.77)
TSH SERPL DL<=0.005 MIU/L-ACNC: 1.59 UIU/ML (ref 0.45–4.5)
URATE SERPL-MCNC: 3.1 MG/DL (ref 2.5–7.1)
VIT B12 SERPL-MCNC: 348 PG/ML (ref 232–1245)

## 2019-08-01 ENCOUNTER — TELEPHONE (OUTPATIENT)
Dept: FAMILY MEDICINE CLINIC | Age: 49
End: 2019-08-01

## 2019-08-01 DIAGNOSIS — F10.21 ALCOHOLISM IN RECOVERY (HCC): ICD-10-CM

## 2019-08-01 DIAGNOSIS — E83.52 HYPERCALCEMIA: ICD-10-CM

## 2019-08-01 DIAGNOSIS — E55.9 VITAMIN D DEFICIENCY: Primary | ICD-10-CM

## 2019-08-01 DIAGNOSIS — M85.80 OSTEOPENIA, UNSPECIFIED LOCATION: ICD-10-CM

## 2019-08-01 DIAGNOSIS — F10.20 ALCOHOLISM (HCC): ICD-10-CM

## 2019-08-01 DIAGNOSIS — D75.89 MACROCYTOSIS WITHOUT ANEMIA: ICD-10-CM

## 2019-08-01 RX ORDER — LANOLIN ALCOHOL/MO/W.PET/CERES
500 CREAM (GRAM) TOPICAL DAILY
Qty: 90 TAB | Refills: 3 | Status: SHIPPED | OUTPATIENT
Start: 2019-08-01 | End: 2021-02-10

## 2019-08-01 RX ORDER — MELATONIN
1000 DAILY
Qty: 90 TAB | Refills: 4 | Status: SHIPPED | OUTPATIENT
Start: 2019-08-01 | End: 2021-02-10

## 2019-08-12 ENCOUNTER — TELEPHONE (OUTPATIENT)
Dept: FAMILY MEDICINE CLINIC | Age: 49
End: 2019-08-12

## 2019-08-13 NOTE — TELEPHONE ENCOUNTER
Patient called again for her lab results that where given to her 7/25 by nurse. Told she needs to come in for lab draw to reevaluate the labs from before. Then she needs to make a follow up appointment with [facility]rea. States she understands.

## 2019-08-23 ENCOUNTER — HOSPITAL ENCOUNTER (OUTPATIENT)
Dept: LAB | Age: 49
Discharge: HOME | End: 2019-08-23

## 2019-08-23 ENCOUNTER — HOSPITAL ENCOUNTER (OUTPATIENT)
Dept: GENERAL RADIOLOGY | Age: 49
Discharge: HOME | End: 2019-08-23
Payer: MEDICAID

## 2019-08-23 DIAGNOSIS — M79.675 PAIN OF LEFT GREAT TOE: ICD-10-CM

## 2019-08-23 LAB — XX-LABCORP SPECIMEN COL,LCBCF: NORMAL

## 2019-08-23 PROCEDURE — 73630 X-RAY EXAM OF FOOT: CPT

## 2019-08-23 PROCEDURE — 99001 SPECIMEN HANDLING PT-LAB: CPT

## 2019-08-24 LAB
BASOPHILS # BLD AUTO: 0 X10E3/UL (ref 0–0.2)
BASOPHILS NFR BLD AUTO: 1 %
CALCIUM SERPL-MCNC: 9.8 MG/DL (ref 8.7–10.2)
EOSINOPHIL # BLD AUTO: 0 X10E3/UL (ref 0–0.4)
EOSINOPHIL NFR BLD AUTO: 1 %
ERYTHROCYTE [DISTWIDTH] IN BLOOD BY AUTOMATED COUNT: 12.1 % (ref 12.3–15.4)
HCT VFR BLD AUTO: 33.1 % (ref 34–46.6)
HGB BLD-MCNC: 11.1 G/DL (ref 11.1–15.9)
IMM GRANULOCYTES # BLD AUTO: 0 X10E3/UL (ref 0–0.1)
IMM GRANULOCYTES NFR BLD AUTO: 0 %
LYMPHOCYTES # BLD AUTO: 1.1 X10E3/UL (ref 0.7–3.1)
LYMPHOCYTES NFR BLD AUTO: 26 %
MCH RBC QN AUTO: 34.9 PG (ref 26.6–33)
MCHC RBC AUTO-ENTMCNC: 33.5 G/DL (ref 31.5–35.7)
MCV RBC AUTO: 104 FL (ref 79–97)
MONOCYTES # BLD AUTO: 0.6 X10E3/UL (ref 0.1–0.9)
MONOCYTES NFR BLD AUTO: 14 %
NEUTROPHILS # BLD AUTO: 2.5 X10E3/UL (ref 1.4–7)
NEUTROPHILS NFR BLD AUTO: 58 %
PLATELET # BLD AUTO: 152 X10E3/UL (ref 150–450)
PTH-INTACT SERPL-MCNC: NORMAL PG/ML
RBC # BLD AUTO: 3.18 X10E6/UL (ref 3.77–5.28)
SPECIMEN STATUS REPORT, ROLRST: NORMAL
WBC # BLD AUTO: 4.2 X10E3/UL (ref 3.4–10.8)

## 2019-08-27 ENCOUNTER — TELEPHONE (OUTPATIENT)
Dept: FAMILY MEDICINE CLINIC | Age: 49
End: 2019-08-27

## 2019-08-29 ENCOUNTER — TELEPHONE (OUTPATIENT)
Dept: FAMILY MEDICINE CLINIC | Age: 49
End: 2019-08-29

## 2019-08-29 NOTE — TELEPHONE ENCOUNTER
Called patient gave lab results. Patient states she is unable to walk, told to call for an appointment to be sooner.

## 2019-08-29 NOTE — TELEPHONE ENCOUNTER
Pt called in and was wanting to know if Keyshawn Arana could give her a call back as soon as she can. She informed me that she had just spoken with Keyshawn Arana but forgot to tell her something.  I did let her know that she was seeing patients but I would let her know to give her a call as soon as she could, Please advise,

## 2019-09-04 NOTE — TELEPHONE ENCOUNTER
Nurse spoke to patient, Patient stated she will speak with provider at the time of her appointment. 09/10/2019 scheduled appt.

## 2019-10-02 ENCOUNTER — HOSPITAL ENCOUNTER (OUTPATIENT)
Dept: GENERAL RADIOLOGY | Age: 49
Discharge: HOME | End: 2019-10-02
Attending: FAMILY MEDICINE
Payer: MEDICAID

## 2019-10-02 ENCOUNTER — OFFICE VISIT (OUTPATIENT)
Dept: FAMILY MEDICINE CLINIC | Age: 49
End: 2019-10-02

## 2019-10-02 VITALS
RESPIRATION RATE: 18 BRPM | HEART RATE: 89 BPM | TEMPERATURE: 97.9 F | WEIGHT: 106.8 LBS | SYSTOLIC BLOOD PRESSURE: 122 MMHG | OXYGEN SATURATION: 100 % | DIASTOLIC BLOOD PRESSURE: 79 MMHG | BODY MASS INDEX: 17.79 KG/M2 | HEIGHT: 65 IN

## 2019-10-02 DIAGNOSIS — F17.210 CIGARETTE NICOTINE DEPENDENCE WITHOUT COMPLICATION: ICD-10-CM

## 2019-10-02 DIAGNOSIS — M54.10 BILATERAL RADICULAR PAIN: ICD-10-CM

## 2019-10-02 DIAGNOSIS — Z23 ENCOUNTER FOR IMMUNIZATION: Primary | ICD-10-CM

## 2019-10-02 PROCEDURE — 72110 X-RAY EXAM L-2 SPINE 4/>VWS: CPT

## 2019-10-02 RX ORDER — GABAPENTIN 100 MG/1
100 CAPSULE ORAL 3 TIMES DAILY
Qty: 90 CAP | Refills: 0 | Status: SHIPPED | OUTPATIENT
Start: 2019-10-02 | End: 2020-03-02

## 2019-10-02 RX ORDER — PREDNISONE 20 MG/1
60 TABLET ORAL
Qty: 21 TAB | Refills: 0 | Status: SHIPPED | OUTPATIENT
Start: 2019-10-02 | End: 2019-12-10

## 2019-10-02 RX ORDER — VARENICLINE TARTRATE 25 MG
KIT ORAL
Qty: 1 DOSE PACK | Refills: 0 | Status: SHIPPED | OUTPATIENT
Start: 2019-10-02 | End: 2019-12-10

## 2019-10-02 NOTE — PATIENT INSTRUCTIONS
Stopping Smoking: Care Instructions  Your Care Instructions  Cigarette smokers crave the nicotine in cigarettes. Giving it up is much harder than simply changing a habit. Your body has to stop craving the nicotine. It is hard to quit, but you can do it. There are many tools that people use to quit smoking. You may find that combining tools works best for you. There are several steps to quitting. First you get ready to quit. Then you get support to help you. After that, you learn new skills and behaviors to become a nonsmoker. For many people, a necessary step is getting and using medicine. Your doctor will help you set up the plan that best meets your needs. You may want to attend a smoking cessation program to help you quit smoking. When you choose a program, look for one that has proven success. Ask your doctor for ideas. You will greatly increase your chances of success if you take medicine as well as get counseling or join a cessation program.  Some of the changes you feel when you first quit tobacco are uncomfortable. Your body will miss the nicotine at first, and you may feel short-tempered and grumpy. You may have trouble sleeping or concentrating. Medicine can help you deal with these symptoms. You may struggle with changing your smoking habits and rituals. The last step is the tricky one: Be prepared for the smoking urge to continue for a time. This is a lot to deal with, but keep at it. You will feel better. Follow-up care is a key part of your treatment and safety. Be sure to make and go to all appointments, and call your doctor if you are having problems. It's also a good idea to know your test results and keep a list of the medicines you take. How can you care for yourself at home? · Ask your family, friends, and coworkers for support. You have a better chance of quitting if you have help and support.   · Join a support group, such as Nicotine Anonymous, for people who are trying to quit smoking. · Consider signing up for a smoking cessation program, such as the American Lung Association's Freedom from Smoking program.  · Get text messaging support. Go to the website at www.smokefree. gov to sign up for the [facility] program.  · Set a quit date. Pick your date carefully so that it is not right in the middle of a big deadline or stressful time. Once you quit, do not even take a puff. Get rid of all ashtrays and lighters after your last cigarette. Clean your house and your clothes so that they do not smell of smoke. · Learn how to be a nonsmoker. Think about ways you can avoid those things that make you reach for a cigarette. ? Avoid situations that put you at greatest risk for smoking. For some people, it is hard to have a drink with friends without smoking. For others, they might skip a coffee break with coworkers who smoke. ? Change your daily routine. Take a different route to work or eat a meal in a different place. · Cut down on stress. Calm yourself or release tension by doing an activity you enjoy, such as reading a book, taking a hot bath, or gardening. · Talk to your doctor or pharmacist about nicotine replacement therapy, which replaces the nicotine in your body. You still get nicotine but you do not use tobacco. Nicotine replacement products help you slowly reduce the amount of nicotine you need. These products come in several forms, many of them available over-the-counter:  ? Nicotine patches  ? Nicotine gum and lozenges  ? Nicotine inhaler  · Ask your doctor about bupropion (Wellbutrin) or varenicline (Chantix), which are prescription medicines. They do not contain nicotine. They help you by reducing withdrawal symptoms, such as stress and anxiety. · Some people find hypnosis, acupuncture, and massage helpful for ending the smoking habit. · Eat a healthy diet and get regular exercise. Having healthy habits will help your body move past its craving for nicotine.   · Be prepared to keep trying. Most people are not successful the first few times they try to quit. Do not get mad at yourself if you smoke again. Make a list of things you learned and think about when you want to try again, such as next week, next month, or next year. Where can you learn more? Go to http://juan-nayla.info/. Enter D911 in the search box to learn more about \"Stopping Smoking: Care Instructions. \"  Current as of: September 26, 2018  Content Version: 12.2  © 4295-4794 [employer]. Care instructions adapted under license by [employer] (which disclaims liability or warranty for this information). If you have questions about a medical condition or this instruction, always ask your healthcare professional. [address] any warranty or liability for your use of this information.

## 2019-10-02 NOTE — PROGRESS NOTES
Bart Galicia is a 44 y.o.  female and presents with    Chief Complaint   Patient presents with    Foot Pain     bilateral foot pain x 4 months      Subjective:  Ms. Eloisa Castro presents for bilateral foot pain which she reports is located on the balls of her feet; she states that they feel swollen. She has had multiple screening tests; she is taking vitamin B12;   She has applied capsaicin cream and had severe pain. ROS   Constitutional: Negative for appetite change, diaphoresis, fatigue and unexpected weight change. Eyes: Negative for visual disturbance. Respiratory: Negative for cough, chest tightness and shortness of breath. Cardiovascular: Negative for chest pain, palpitations and leg swelling. Gastrointestinal: Negative for abdominal distention, abdominal pain, blood in stool, constipation, diarrhea, nausea, rectal pain and vomiting. Endocrine: Negative for polydipsia, polyphagia and polyuria. Genitourinary: Negative for decreased urine volume, dysuria and frequency. Musculoskeletal: Positive for arthralgias (Foot pain). Negative for joint swelling and myalgias. Skin: Negative for rash and wound. Neurological: Negative for dizziness, weakness, light-headedness, numbness and headaches. Psychiatric/Behavioral: Positive for decreased concentration (Memory problems). Negative for dysphoric mood and sleep disturbance. The patient is not nervous/anxious.         Objective:  Vitals:    10/02/19 0959   BP: 122/79   Pulse: 89   Resp: 18   Temp: 97.9 °F (36.6 °C)   TempSrc: Oral   SpO2: 100%   Weight: 106 lb 12.8 oz (48.4 kg)   Height: 5' 5\" (1.651 m)   PainSc:   8   PainLoc: Foot       alert, well appearing, and in no distress, oriented to person, place, and time and thin  Mental status - anxious  Chest - clear to auscultation, no wheezes, rales or rhonchi, symmetric air entry  Heart - normal rate, regular rhythm, normal S1, S2, no murmurs, rubs, clicks or gallops  Neurological - cranial nerves II through XII intact, antalgic gait and station    LABS   hgb 11.1  mcv 104    Assessment/Plan:    1. Encounter for immunization    - INFLUENZA VIRUS VAC QUAD,SPLIT,PRESV FREE SYRINGE IM  - RI IMMUNIZ ADMIN,1 SINGLE/COMB VAC/TOXOID    2. Bilateral radicular pain  Pt has h/o scoliosis and fall with injury to sacral bones; concern for radicular pain with change in spine; plain film ordered  - gabapentin (NEURONTIN) 100 mg capsule; Take 1 Cap by mouth three (3) times daily. Max Daily Amount: 300 mg. Dispense: 90 Cap; Refill: 0  - XR SPINE LUMB MIN 4 V; Future  - predniSONE (DELTASONE) 20 mg tablet; Take 60 mg by mouth daily (with breakfast). Dispense: 21 Tab; Refill: 0    3. Cigarette nicotine dependence without complication  Counseled on smoking cessation  - varenicline (CHANTIX STARTING MONTH BOX) 0.5 mg (11)- 1 mg (42) DsPk; Per Dose pack instructions  Dispense: 1 Dose Pack; Refill: 0      Lab review: no lab studies available for review at time of visit      I have discussed the diagnosis with the patient and the intended plan as seen in the above orders. The patient has received an after-visit summary and questions were answered concerning future plans. I have discussed medication side effects and warnings with the patient as well. I have reviewed the plan of care with the patient, accepted their input and they are in agreement with the treatment goals.

## 2019-10-02 NOTE — PROGRESS NOTES
1. Have you been to the ER, urgent care clinic since your last visit? Hospitalized since your last visit? No    2. Have you seen or consulted any other health care providers outside of the [address] [place] [address] since your last visit? Include any pap smears or colon screening.  No

## 2019-10-02 NOTE — PROGRESS NOTES
Kervin Vega is a 44 y.o. female who presents for routine immunizations. She denies any symptoms , reactions or allergies that would exclude them from being immunized today. Risks and adverse reactions were discussed and the VIS was given to them. All questions were addressed. She was observed for 15 min post injection. There were no reactions observed.     Sonam Bhatia LPN

## 2019-11-07 ENCOUNTER — TELEPHONE (OUTPATIENT)
Dept: FAMILY MEDICINE CLINIC | Age: 49
End: 2019-11-07

## 2019-11-07 NOTE — TELEPHONE ENCOUNTER
Lvm detailing that xray showed arthritis, no fracture. Advised to call back if she have further questions, this encounter will be closed.

## 2019-12-10 ENCOUNTER — HOSPITAL ENCOUNTER (EMERGENCY)
Age: 49
Discharge: HOME | End: 2019-12-10
Attending: EMERGENCY MEDICINE | Admitting: EMERGENCY MEDICINE
Payer: MEDICAID

## 2019-12-10 ENCOUNTER — APPOINTMENT (OUTPATIENT)
Dept: ULTRASOUND IMAGING | Age: 49
End: 2019-12-10
Attending: EMERGENCY MEDICINE
Payer: MEDICAID

## 2019-12-10 ENCOUNTER — APPOINTMENT (OUTPATIENT)
Dept: CT IMAGING | Age: 49
End: 2019-12-10
Attending: EMERGENCY MEDICINE
Payer: MEDICAID

## 2019-12-10 VITALS
TEMPERATURE: 97.4 F | SYSTOLIC BLOOD PRESSURE: 126 MMHG | DIASTOLIC BLOOD PRESSURE: 89 MMHG | WEIGHT: 104 LBS | HEIGHT: 65 IN | RESPIRATION RATE: 20 BRPM | HEART RATE: 98 BPM | BODY MASS INDEX: 17.33 KG/M2 | OXYGEN SATURATION: 100 %

## 2019-12-10 DIAGNOSIS — E87.6 HYPOKALEMIA: ICD-10-CM

## 2019-12-10 DIAGNOSIS — R10.9 RIGHT FLANK PAIN: Primary | ICD-10-CM

## 2019-12-10 LAB
ALBUMIN SERPL-MCNC: 4.4 G/DL (ref 3.4–5)
ALBUMIN/GLOB SERPL: 1.4 {RATIO} (ref 0.8–1.7)
ALP SERPL-CCNC: 108 U/L (ref 45–117)
ALT SERPL-CCNC: 93 U/L (ref 13–56)
ANION GAP SERPL CALC-SCNC: 8 MMOL/L (ref 3–18)
APPEARANCE UR: ABNORMAL
AST SERPL-CCNC: 101 U/L (ref 10–38)
BACTERIA URNS QL MICRO: ABNORMAL /HPF
BASOPHILS # BLD: 0 K/UL (ref 0–0.1)
BASOPHILS NFR BLD: 0 % (ref 0–2)
BILIRUB SERPL-MCNC: 0.7 MG/DL (ref 0.2–1)
BILIRUB UR QL: ABNORMAL
BUN SERPL-MCNC: 9 MG/DL (ref 7–18)
BUN/CREAT SERPL: 13 (ref 12–20)
CALCIUM SERPL-MCNC: 10 MG/DL (ref 8.5–10.1)
CAOX CRY URNS QL MICRO: ABNORMAL
CHLORIDE SERPL-SCNC: 102 MMOL/L (ref 100–111)
CO2 SERPL-SCNC: 27 MMOL/L (ref 21–32)
COLOR UR: ABNORMAL
CREAT SERPL-MCNC: 0.69 MG/DL (ref 0.6–1.3)
DIFFERENTIAL METHOD BLD: ABNORMAL
EOSINOPHIL # BLD: 0 K/UL (ref 0–0.4)
EOSINOPHIL NFR BLD: 1 % (ref 0–5)
EPITH CASTS URNS QL MICRO: ABNORMAL /LPF (ref 0–5)
ERYTHROCYTE [DISTWIDTH] IN BLOOD BY AUTOMATED COUNT: 13 % (ref 11.6–14.5)
GLOBULIN SER CALC-MCNC: 3.1 G/DL (ref 2–4)
GLUCOSE SERPL-MCNC: 106 MG/DL (ref 74–99)
GLUCOSE UR STRIP.AUTO-MCNC: NEGATIVE MG/DL
GRAN CASTS URNS QL MICRO: ABNORMAL /LPF
HCT VFR BLD AUTO: 34.5 % (ref 35–45)
HGB BLD-MCNC: 11.8 G/DL (ref 12–16)
HGB UR QL STRIP: NEGATIVE
KETONES UR QL STRIP.AUTO: 15 MG/DL
LACTATE BLD-SCNC: 0.84 MMOL/L (ref 0.4–2)
LEUKOCYTE ESTERASE UR QL STRIP.AUTO: ABNORMAL
LIPASE SERPL-CCNC: 255 U/L (ref 73–393)
LYMPHOCYTES # BLD: 1 K/UL (ref 0.9–3.6)
LYMPHOCYTES NFR BLD: 32 % (ref 21–52)
MCH RBC QN AUTO: 36.8 PG (ref 24–34)
MCHC RBC AUTO-ENTMCNC: 34.2 G/DL (ref 31–37)
MCV RBC AUTO: 107.5 FL (ref 74–97)
MONOCYTES # BLD: 0.4 K/UL (ref 0.05–1.2)
MONOCYTES NFR BLD: 12 % (ref 3–10)
MUCOUS THREADS URNS QL MICRO: ABNORMAL /LPF
NEUTS SEG # BLD: 1.6 K/UL (ref 1.8–8)
NEUTS SEG NFR BLD: 55 % (ref 40–73)
NITRITE UR QL STRIP.AUTO: NEGATIVE
PH UR STRIP: 7.5 [PH] (ref 5–8)
PLATELET # BLD AUTO: 87 K/UL (ref 135–420)
PMV BLD AUTO: 11.2 FL (ref 9.2–11.8)
POTASSIUM SERPL-SCNC: 3 MMOL/L (ref 3.5–5.5)
PROT SERPL-MCNC: 7.5 G/DL (ref 6.4–8.2)
PROT UR STRIP-MCNC: ABNORMAL MG/DL
RBC # BLD AUTO: 3.21 M/UL (ref 4.2–5.3)
RBC #/AREA URNS HPF: 0 /HPF (ref 0–5)
SODIUM SERPL-SCNC: 137 MMOL/L (ref 136–145)
SP GR UR REFRACTOMETRY: 1.02 (ref 1–1.03)
UROBILINOGEN UR QL STRIP.AUTO: 1 EU/DL (ref 0.2–1)
WBC # BLD AUTO: 3 K/UL (ref 4.6–13.2)
WBC URNS QL MICRO: ABNORMAL /HPF (ref 0–4)

## 2019-12-10 PROCEDURE — 96361 HYDRATE IV INFUSION ADD-ON: CPT

## 2019-12-10 PROCEDURE — 74177 CT ABD & PELVIS W/CONTRAST: CPT

## 2019-12-10 PROCEDURE — 81001 URINALYSIS AUTO W/SCOPE: CPT

## 2019-12-10 PROCEDURE — 74011636320 HC RX REV CODE- 636/320: Performed by: EMERGENCY MEDICINE

## 2019-12-10 PROCEDURE — 85025 COMPLETE CBC W/AUTO DIFF WBC: CPT

## 2019-12-10 PROCEDURE — 96374 THER/PROPH/DIAG INJ IV PUSH: CPT

## 2019-12-10 PROCEDURE — 74011250637 HC RX REV CODE- 250/637: Performed by: EMERGENCY MEDICINE

## 2019-12-10 PROCEDURE — 99283 EMERGENCY DEPT VISIT LOW MDM: CPT

## 2019-12-10 PROCEDURE — 76705 ECHO EXAM OF ABDOMEN: CPT

## 2019-12-10 PROCEDURE — 83605 ASSAY OF LACTIC ACID: CPT

## 2019-12-10 PROCEDURE — 74011250636 HC RX REV CODE- 250/636: Performed by: EMERGENCY MEDICINE

## 2019-12-10 PROCEDURE — 83690 ASSAY OF LIPASE: CPT

## 2019-12-10 PROCEDURE — 80053 COMPREHEN METABOLIC PANEL: CPT

## 2019-12-10 PROCEDURE — 96375 TX/PRO/DX INJ NEW DRUG ADDON: CPT

## 2019-12-10 RX ORDER — FENTANYL CITRATE 50 UG/ML
25 INJECTION, SOLUTION INTRAMUSCULAR; INTRAVENOUS
Status: COMPLETED | OUTPATIENT
Start: 2019-12-10 | End: 2019-12-10

## 2019-12-10 RX ORDER — ONDANSETRON 2 MG/ML
4 INJECTION INTRAMUSCULAR; INTRAVENOUS
Status: COMPLETED | OUTPATIENT
Start: 2019-12-10 | End: 2019-12-10

## 2019-12-10 RX ADMIN — POTASSIUM BICARBONATE 40 MEQ: 782 TABLET, EFFERVESCENT ORAL at 13:09

## 2019-12-10 RX ADMIN — IOPAMIDOL 93 ML: 612 INJECTION, SOLUTION INTRAVENOUS at 12:39

## 2019-12-10 RX ADMIN — SODIUM CHLORIDE 1000 ML: 900 INJECTION, SOLUTION INTRAVENOUS at 11:19

## 2019-12-10 RX ADMIN — ONDANSETRON 4 MG: 2 INJECTION INTRAMUSCULAR; INTRAVENOUS at 11:20

## 2019-12-10 RX ADMIN — FENTANYL CITRATE 25 MCG: 50 INJECTION, SOLUTION INTRAMUSCULAR; INTRAVENOUS at 11:21

## 2019-12-10 NOTE — ED NOTES
I have reviewed discharge instructions with the patient. The patient verbalized understanding. Patient armband removed and given to patient to take home. Patient was informed of the privacy risks if armband lost or stolen    The patient Rodri Rai is a 44 y.o. female who  has a past medical history of Fibroadenoma of breast, right and Malignant melanoma of forehead ([address] .  She   The patient's medications were reviewed and discussed prior to discharge. The patient was very interactive and did understand their medications. The following medications were discussed in details with the patient. The patient said they [facility]. [email or web address]    Chhaya Estrada RN    [employer] Activation    Thank you for requesting access to [employer]. Please follow the instructions below to securely access and download your online medical record. [employer] allows you to send messages to your doctor, view your test results, renew your prescriptions, schedule appointments, and more. How Do I Sign Up? 1. In your internet browser, go to [email or web address]  2. Click on the First Time User? Click Here link in the Sign In box. You will be redirect to the New Member Sign Up page. 3. Enter your [employer] Access Code exactly as it appears below. You will not need to use this code after youve completed the sign-up process. If you do not sign up before the expiration date, you must request a new code. [employer] Access Code: [unfilled] (This is the date your [employer] access code will )    4. Enter the last four digits of your Social Security Number (xxxx) and Date of Birth (mm/dd/yyyy) as indicated and click Submit. You will be taken to the next sign-up page. 5. Create a [employer] ID. This will be your [employer] login ID and cannot be changed, so think of one that is secure and easy to remember. 6. Create a [employer] password. You can change your password at any time.   7. Enter your Password Reset Question and Answer. This can be used at a later time if you forget your password. 8. Enter your e-mail address. You will receive e-mail notification when new information is available in [address] 9. Click Sign Up. You can now view and download portions of your medical record. 10. Click the Download Summary menu link to download a portable copy of your medical information. Additional Information    If you have questions, please visit the Frequently Asked Questions section of the [email or web address] website at [email or web address] Remember, [email or web address] is NOT to be used for urgent needs. For medical emergencies, dial 911.

## 2019-12-10 NOTE — ED PROVIDER NOTES
EMERGENCY DEPARTMENT HISTORY AND PHYSICAL EXAM    10:50 AM      Date: 12/10/2019  Patient Name: Jahaira Roy    History of Presenting Illness     Chief Complaint   Patient presents with    Flank Pain    Diarrhea         History Provided By: Patient      Additional History (Context): Jahaira Roy is a 44 y.o. female who presents with abdominal pain, nausea, vomiting, and diarrhea. .  Patient states that starting 3 days ago she had onset of right-sided flank pain that she describes as sharp and stabbing and nonradiating. Patient states that she then had onset of multiple episodes of nonbloody diarrhea. The patient states that she could not come to the emergency department that time, because she had children at her house. The patient states that the pain improved the day after, but then got worse yesterday. The patient had a couple episodes of nonbloody nonbilious emesis associate with nausea. She admits to chills. Patient states that she dealt with the pain yesterday, no treatment prior to arrival.  Patient states that today she cannot take the pain anymore. She denies any history of kidney stones. She admits to abdominal surgical history consistent with . She denies any fevers, lightheadedness, dizziness, chest pain, shortness of breath, dysuria, hematuria. PCP: Iona Cockayne, MD      Current Outpatient Medications   Medication Sig Dispense Refill    gabapentin (NEURONTIN) 100 mg capsule Take 1 Cap by mouth three (3) times daily. Max Daily Amount: 300 mg. 90 Cap 0    cholecalciferol (VITAMIN D3) 1,000 unit tablet Take 1 Tab by mouth daily. 90 Tab 4    cyanocobalamin (VITAMIN B12) 500 mcg tablet Take 1 Tab by mouth daily. 90 Tab 3    atorvastatin (LIPITOR) 20 mg tablet Take 1 Tab by mouth daily.  [address] Tab 1       Past History     Past Medical History:  Past Medical History:   Diagnosis Date    Fibroadenoma of breast, right     Malignant melanoma of forehead ([address]        Past Surgical History:  Past Surgical History:   Procedure Laterality Date    HX BREAST BIOPSY Right 2017    Benign    HX  SECTION         Family History:  Family History   Problem Relation Age of Onset    Dementia Father     No Known Problems Sister     No Known Problems Brother        Social History:  Social History     Tobacco Use    Smoking status: Current Every Day Smoker     Packs/day: 1.00     Years: 14.00     Pack years: 14.00     Types: Cigarettes    Smokeless tobacco: Never Used   Substance Use Topics    Alcohol use: Not Currently     Frequency: 4 or more times a week     Comment: Last drink was 2019. Previously heavy drinker.  Drug use: Never       Allergies: Allergies   Allergen Reactions    Bactrim [Sulfamethoprim] Rash    Penicillins Rash and Other (comments)         Review of Systems       Review of Systems   Constitutional: Positive for chills. Negative for fatigue and fever. HENT: Negative for congestion, rhinorrhea, sinus pressure, sinus pain, sneezing and sore throat. Eyes: Negative for photophobia and visual disturbance. Respiratory: Negative for cough, shortness of breath and wheezing. Cardiovascular: Negative for chest pain and palpitations. Gastrointestinal: Positive for abdominal pain, diarrhea, nausea and vomiting. Genitourinary: Positive for flank pain. Negative for dysuria, frequency and hematuria. Musculoskeletal: Negative for arthralgias, neck pain and neck stiffness. Skin: Negative for rash and wound. Neurological: Negative for dizziness, light-headedness and headaches. Psychiatric/Behavioral: Negative for agitation, behavioral problems and confusion.          Physical Exam     Visit Vitals  BP (!) 132/91 (BP 1 Location: Right arm, BP Patient Position: Sitting)   Pulse 98   Temp 97.4 °F (36.3 °C)   Resp 20   Ht 5' 5\" (1.651 m)   Wt 47.2 kg (104 lb)   SpO2 100%   BMI 17.31 kg/m²       Physical Exam  Constitutional:       General: She is not in acute distress. Appearance: Normal appearance. She is not ill-appearing. HENT:      Head: Normocephalic and atraumatic. Nose: Nose normal. No congestion or rhinorrhea. Eyes:      General:         Right eye: No discharge. Left eye: No discharge. Conjunctiva/sclera: Conjunctivae normal.   Neck:      Musculoskeletal: Normal range of motion and neck supple. No neck rigidity. Cardiovascular:      Rate and Rhythm: Normal rate. Heart sounds: No murmur. No gallop. Pulmonary:      Effort: Pulmonary effort is normal. No respiratory distress. Breath sounds: Normal breath sounds. No wheezing. Abdominal:      Tenderness: There is tenderness. Comments: Patient is diffusely tender of the right flank, there is tenderness of the right upper quadrant with voluntary guarding. Abdomen is soft, nondistended. No rigidity. No significant right lower quadrant tenderness, negative McBurney. Musculoskeletal: Normal range of motion. General: No swelling or deformity. Lymphadenopathy:      Cervical: No cervical adenopathy. Skin:     General: Skin is warm and dry. Capillary Refill: Capillary refill takes less than 2 seconds. Coloration: Skin is not jaundiced. Findings: No bruising. Neurological:      General: No focal deficit present. Mental Status: She is alert and oriented to person, place, and time. Cranial Nerves: No cranial nerve deficit. Motor: No weakness. Psychiatric:         Mood and Affect: Mood normal.         Thought Content:  Thought content normal.           Diagnostic Study Results     Labs -  Recent Results (from the past 12 hour(s))   URINALYSIS W/ RFLX MICROSCOPIC    Collection Time: 12/10/19 10:31 AM   Result Value Ref Range    Color DARK YELLOW      Appearance CLOUDY      Specific gravity 1.024 1.005 - 1.030      pH (UA) 7.5 5.0 - 8.0      Protein TRACE (A) NEG mg/dL    Glucose NEGATIVE  NEG mg/dL    Ketone 15 (A) NEG mg/dL Bilirubin SMALL (A) NEG      Blood NEGATIVE  NEG      Urobilinogen 1.0 0.2 - 1.0 EU/dL    Nitrites NEGATIVE  NEG      Leukocyte Esterase SMALL (A) NEG     URINE MICROSCOPIC ONLY    Collection Time: 12/10/19 10:31 AM   Result Value Ref Range    WBC 0 to 3 0 - 4 /hpf    RBC 0 0 - 5 /hpf    Epithelial cells 3+ 0 - 5 /lpf    Bacteria 2+ (A) NEG /hpf    Mucus 4+ (A) NEG /lpf    CA Oxalate crystals 3+ (A) NEG    Granular cast 0 to 1 NEG /lpf   CBC WITH AUTOMATED DIFF    Collection Time: 12/10/19 10:45 AM   Result Value Ref Range    WBC 3.0 (L) 4.6 - 13.2 K/uL    RBC 3.21 (L) 4.20 - 5.30 M/uL    HGB 11.8 (L) 12.0 - 16.0 g/dL    HCT 34.5 (L) 35.0 - 45.0 %    .5 (H) 74.0 - 97.0 FL    MCH 36.8 (H) 24.0 - 34.0 PG    MCHC 34.2 31.0 - 37.0 g/dL    RDW 13.0 11.6 - 14.5 %    PLATELET 87 (L) 855 - 420 K/uL    MPV 11.2 9.2 - 11.8 FL    NEUTROPHILS 55 40 - 73 %    LYMPHOCYTES 32 21 - 52 %    MONOCYTES 12 (H) 3 - 10 %    EOSINOPHILS 1 0 - 5 %    BASOPHILS 0 0 - 2 %    ABS. NEUTROPHILS 1.6 (L) 1.8 - 8.0 K/UL    ABS. LYMPHOCYTES 1.0 0.9 - 3.6 K/UL    ABS. MONOCYTES 0.4 0.05 - 1.2 K/UL    ABS. EOSINOPHILS 0.0 0.0 - 0.4 K/UL    ABS. BASOPHILS 0.0 0.0 - 0.1 K/UL    DF AUTOMATED     METABOLIC PANEL, COMPREHENSIVE    Collection Time: 12/10/19 10:45 AM   Result Value Ref Range    Sodium 137 136 - 145 mmol/L    Potassium 3.0 (L) 3.5 - 5.5 mmol/L    Chloride 102 100 - 111 mmol/L    CO2 27 21 - 32 mmol/L    Anion gap 8 3.0 - 18 mmol/L    Glucose 106 (H) 74 - 99 mg/dL    BUN 9 7.0 - 18 MG/DL    Creatinine 0.69 0.6 - 1.3 MG/DL    BUN/Creatinine ratio 13 12 - 20      GFR est AA >60 >60 ml/min/1.73m2    GFR est non-AA >60 >60 ml/min/1.73m2    Calcium 10.0 8.5 - 10.1 MG/DL    Bilirubin, total 0.7 0.2 - 1.0 MG/DL    ALT (SGPT) 93 (H) 13 - 56 U/L    AST (SGOT) 101 (H) 10 - 38 U/L    Alk.  phosphatase 108 45 - 117 U/L    Protein, total 7.5 6.4 - 8.2 g/dL    Albumin 4.4 3.4 - 5.0 g/dL    Globulin 3.1 2.0 - 4.0 g/dL    A-G Ratio 1.4 0.8 - 1.7 LIPASE    Collection Time: 12/10/19 10:45 AM   Result Value Ref Range    Lipase 255 73 - 393 U/L   POC LACTIC ACID    Collection Time: 12/10/19 10:59 AM   Result Value Ref Range    Lactic Acid (POC) 0.84 0.40 - 2.00 mmol/L       Radiologic Studies -   CT ABD PELV W CONT   Final Result   IMPRESSION:      1. No acute intra-abdominal or pelvic abnormality present. 2. Normal caliber small and large bowel, to include a normal appendix. 3. Probable hepatic steatosis. US ABD LTD   Final Result   IMPRESSION:         1. No gallstones or secondary findings of cholecystitis. .      2. Increased hepatic parenchymal echotexture, a nonspecific finding which can be   seen with steatosis               Medical Decision Making   I am the first provider for this patient. I reviewed the vital signs, available nursing notes, past medical history, past surgical history, family history and social history. Vital Signs-Reviewed the patient's vital signs. Records Reviewed: Nursing Notes (Time of Review: 10:50 AM)    ED Course: Progress Notes, Reevaluation, and Consults:  Time: 13:26. And is resting comfortably in bed. She is drinking her oral potassium at this time. I updated patient on her results. She states that she used to drink very heavily, at least a bottle of wine per day. She has not drink any alcohol for 1 week. Patient states that she is feeling better at this time. She will be discharged home. Patient will return if she is worsening symptoms. Provider Notes (Medical Decision Making):   MDM  Number of Diagnoses or Management Options  Hypokalemia:   Right flank pain:   Diagnosis management comments: Patient is a 47-year-old female who presented with a chief complaint of right-sided flank pain, nausea, vomiting, and diarrhea. Patient states her symptoms started 3 days ago and intermittently got better, but then or worse today.   She did have right-sided flank pain and right upper quadrant abdominal pain. She did have voluntary guarding. Laboratory results are within normal limits, except she does have some thrombocytopenia and hypokalemia. Patient's CT the abdomen showed some fatty infiltrates in her liver, no evidence of cholecystitis. Appendix was normal.  Patient has no urinary tract infection, no evidence of hydronephrosis or kidney stone. I had a lengthy discussion with the patient about her results. She states that she is an alcoholic, she has not had anything to drink for 1 week. She states that she drinks at least a bottle a day of wine. She is trying to quit, she states that she will have intermittent episodes where she will not drink alcohol. Patient will return if she is worsening symptoms. There is no evidence for any acute infection at this time. She will return if she is worsening symptoms. Critical Care Time: 0      Diagnosis     Clinical Impression:   1. Right flank pain    2. Hypokalemia        Disposition: Discharge    Follow-up Information     Follow up With Specialties Details Why Contact Info    Kevin Garcia MD Internal Medicine Call in 1 day  [id number] [address] [phone]             Patient's Medications   Start Taking    No medications on file   Continue Taking    ATORVASTATIN (LIPITOR) 20 MG TABLET    Take 1 Tab by mouth daily. CHOLECALCIFEROL (VITAMIN D3) 1,000 UNIT TABLET    Take 1 Tab by mouth daily. CYANOCOBALAMIN (VITAMIN B12) 500 MCG TABLET    Take 1 Tab by mouth daily. GABAPENTIN (NEURONTIN) 100 MG CAPSULE    Take 1 Cap by mouth three (3) times daily. Max Daily Amount: 300 mg. These Medications have changed    No medications on file   Stop Taking    NICOTINE (NICODERM CQ) 21 MG/24 HR    1 Patch by TransDERmal route every twenty-four (24) hours. PREDNISONE (DELTASONE) 20 MG TABLET    Take 60 mg by mouth daily (with breakfast).     VARENICLINE (CHANTIX STARTING MONTH BOX) 0.5 MG (11)- 1 MG (42) DSPK Per Dose pack instructions     _______________________________    Please note that this dictation was completed with [employer], the computer voice recognition software. Quite often unanticipated grammatical, syntax, homophones, and other interpretive errors are inadvertently transcribed by the computer software. Please disregard these errors. Please excuse any errors that have escaped final proofreading.

## 2019-12-10 NOTE — ED TRIAGE NOTES
Pt arrives with c/o  Multiple bouts of diarrhea, chills no fever and right flank pain since Saturday. States emesis x 1 on Saturday. Pt states no dysuria. Pt states no headache or sore throat. Son just getting over the flu. Pain can be 4/10 to 9/10 based upon movement.

## 2020-03-02 ENCOUNTER — OFFICE VISIT (OUTPATIENT)
Dept: FAMILY MEDICINE CLINIC | Age: 50
End: 2020-03-02

## 2020-03-02 VITALS
TEMPERATURE: 98.5 F | SYSTOLIC BLOOD PRESSURE: 124 MMHG | HEIGHT: 65 IN | HEART RATE: 118 BPM | WEIGHT: 125 LBS | OXYGEN SATURATION: 100 % | RESPIRATION RATE: 16 BRPM | BODY MASS INDEX: 20.83 KG/M2 | DIASTOLIC BLOOD PRESSURE: 73 MMHG

## 2020-03-02 DIAGNOSIS — E55.9 VITAMIN D DEFICIENCY: ICD-10-CM

## 2020-03-02 DIAGNOSIS — E53.8 VITAMIN B12 DEFICIENCY: ICD-10-CM

## 2020-03-02 DIAGNOSIS — Z23 ENCOUNTER FOR IMMUNIZATION: ICD-10-CM

## 2020-03-02 DIAGNOSIS — R74.01 TRANSAMINITIS: ICD-10-CM

## 2020-03-02 DIAGNOSIS — M79.672 BILATERAL FOOT PAIN: ICD-10-CM

## 2020-03-02 DIAGNOSIS — M79.671 BILATERAL FOOT PAIN: ICD-10-CM

## 2020-03-02 DIAGNOSIS — F10.10 ALCOHOL USE DISORDER, MILD, ABUSE: ICD-10-CM

## 2020-03-02 DIAGNOSIS — E78.00 HYPERCHOLESTEROLEMIA: Primary | ICD-10-CM

## 2020-03-02 DIAGNOSIS — E78.00 HYPERCHOLESTEROLEMIA: ICD-10-CM

## 2020-03-02 DIAGNOSIS — D61.818 PANCYTOPENIA (HCC): ICD-10-CM

## 2020-03-02 NOTE — PROGRESS NOTES
After obtaining consent, and per orders of Dr. Louie Eddy, injection of Pneumovax 23 given by Rosita Perdomo LPN. Patient instructed to remain in clinic for 10 minutes afterwards, and to report any adverse reaction to me immediately. Pt tolerated injection well.

## 2020-03-02 NOTE — PROGRESS NOTES
History of Present Illness  Corie Maurer is a 44 y.o. female who presents today for management of    Chief Complaint   Patient presents with    Cholesterol Problem    Follow Up Chronic Condition       Patient reports of improved bilateral foot pain. She also reports of chronic bilateral knee pain. Patient reports that she has quit drinking alcohol few months ago. Cardiovascular Review:  The patient has hyperlipidemia. Diet and Lifestyle: generally follows a low fat low cholesterol diet, generally follows a low sodium diet, sedentary  Pertinent ROS: Patient stopped taking Lipitor due to myalgia, no medication side effects noted, no TIA's, no chest pain on exertion, no dyspnea on exertion, no swelling of ankles. Problem List  Patient Active Problem List    Diagnosis Date Noted    Vitamin B12 deficiency 2020    Transaminitis 2020    Bilateral foot pain 2020    Hypercholesterolemia 2020    [address]ancytopenia ([facility] 2020    Vitamin D deficiency 2019    Tobacco use disorder 2019    Alcohol use disorder, mild, abuse 2019       Past Medical History  Past Medical History:   Diagnosis Date    Fibroadenoma of breast, right     Malignant melanoma of forehead ([facility]         Surgical History  Past Surgical History:   Procedure Laterality Date    HX BREAST BIOPSY Right 2017    Benign    HX  SECTION          Current Medications  Current Outpatient Medications   Medication Sig    estrogen, conjugated,-medroxyPROGESTERone (PREMPRO) 0.3-1.5 mg tab Take 1 Tab by mouth daily.  cholecalciferol (VITAMIN D3) 1,000 unit tablet Take 1 Tab by mouth daily.  cyanocobalamin (VITAMIN B12) 500 mcg tablet Take 1 Tab by mouth daily. No current facility-administered medications for this visit.         Allergies/Drug Reactions  Allergies   Allergen Reactions    Atorvastatin Myalgia    Bactrim [Sulfamethoprim] Rash    Penicillins Rash and Other (comments) Family History  Family History   Problem Relation Age of Onset    Dementia Father     No Known Problems Sister     No Known Problems Brother         Social History  Social History     Socioeconomic History    Marital status: LEGALLY      Spouse name: Not on file    Number of children: Not on file    Years of education: Not on file    Highest education level: Not on file   Occupational History    Not on file   Social Needs    Financial resource strain: Not on file    Food insecurity:     Worry: Not on file     Inability: Not on file    Transportation needs:     Medical: Not on file     Non-medical: Not on file   Tobacco Use    Smoking status: Current Every Day Smoker     Packs/day: 1.00     Years: 14.00     Pack years: 14.00     Types: Cigarettes    Smokeless tobacco: Never Used   Substance and Sexual Activity    Alcohol use: Not Currently     Frequency: 4 or more times a week     Comment: Last drink was 5/2019. Previously heavy drinker.  Drug use: Never    Sexual activity: Not Currently   Lifestyle    Physical activity:     Days per week: Not on file     Minutes per session: Not on file    Stress: Not on file   Relationships    Social connections:     Talks on phone: Not on file     Gets together: Not on file     Attends Anabaptist service: Not on file     Active member of club or organization: Not on file     Attends meetings of clubs or organizations: Not on file     Relationship status: Not on file    Intimate partner violence:     Fear of current or ex partner: Not on file     Emotionally abused: Not on file     Physically abused: Not on file     Forced sexual activity: Not on file   Other Topics Concern    Not on file   Social History Narrative    Not on file       Review of Systems  Review of Systems   Constitutional: Negative. HENT: Negative. Respiratory: Negative. Cardiovascular: Negative. Gastrointestinal: Negative. Genitourinary: Negative. Musculoskeletal: Positive for joint pain. Neurological: Negative. Psychiatric/Behavioral: Negative. Physical Exam  Vital signs:   Vitals:    03/02/20 1045   BP: 124/73   Pulse: (!) 118   Resp: 16   Temp: 98.5 °F (36.9 °C)   TempSrc: Oral   SpO2: 100%   Weight: 125 lb (56.7 kg)   Height: 5' 5\" (1.651 m)       General: alert, oriented, not in distress  Eyes: clear conjunctivae, anicteric sclerae, full and equal ROMs  Chest/Lungs: clear breath sounds, no wheezing or crackles  Heart: normal rate, regular rhythm, no murmur  Extremities: no focal deformities, no edema  Neuro: AAOx3, CN's grossly intact  Skin: no visible abnormalities      Laboratory/Tests:  Component      Latest Ref Rng & Units 12/10/2019 12/10/2019 7/24/2019          10:45 AM 10:45 AM 12:00 AM   WBC      4.6 - 13.2 K/uL 3.0 (L)     RBC      4.20 - 5.30 M/uL 3.21 (L)     HGB      12.0 - 16.0 g/dL 11.8 (L)     HCT      35.0 - 45.0 % 34.5 (L)     MCV      74.0 - 97.0 .5 (H)     MCH      24.0 - 34.0 PG 36.8 (H)     MCHC      31.0 - 37.0 g/dL 34.2     RDW      11.6 - 14.5 % 13.0     PLATELET      543 - 422 K/uL 87 (L)     MPV      9.2 - 11.8 FL 11.2     NEUTROPHILS      40 - 73 % 55     LYMPHOCYTES      21 - 52 % 32     MONOCYTES      3 - 10 % 12 (H)     EOSINOPHILS      0 - 5 % 1     BASOPHILS      0 - 2 % 0     ABS. NEUTROPHILS      1.8 - 8.0 K/UL 1.6 (L)     ABS. LYMPHOCYTES      0.9 - 3.6 K/UL 1.0     ABS. MONOCYTES      0.05 - 1.2 K/UL 0.4     ABS. EOSINOPHILS      0.0 - 0.4 K/UL 0.0     ABS.  BASOPHILS      0.0 - 0.1 K/UL 0.0     DF       AUTOMATED     Sodium      136 - 145 mmol/L  137 139   Potassium      3.5 - 5.5 mmol/L  3.0 (L) 4.4   Chloride      100 - 111 mmol/L  102 103   CO2      21 - 32 mmol/L  27 24   Anion gap      3.0 - 18 mmol/L  8    Glucose      74 - 99 mg/dL  106 (H) 95   BUN      7.0 - 18 MG/DL  9 11   Creatinine      0.6 - 1.3 MG/DL  0.69 0.51 (L)   BUN/Creatinine ratio      12 - 20    13 22   GFR est AA      >60 ml/min/1.73m2  >60 129   GFR est non-AA      >60 ml/min/1.73m2  >60 112   Calcium      8.5 - 10.1 MG/DL  10.0 10.7 (H)   Bilirubin, total      0.2 - 1.0 MG/DL  0.7 0.3   ALT (SGPT)      13 - 56 U/L  93 (H) 17   AST      10 - 38 U/L  101 (H) 16   Alk. phosphatase      45 - 117 U/L  108 73   Protein, total      6.4 - 8.2 g/dL  7.5 6.9   Albumin      3.4 - 5.0 g/dL  4.4 4.8   Globulin      2.0 - 4.0 g/dL  3.1    A-G Ratio      0.8 - 1.7    1.4 2.3 (H)   GLOBULIN, TOTAL      1.5 - 4.5 g/dL   2.1     Component      Latest Ref Rng & Units 12/10/2019 8/23/2019 8/23/2019 7/24/2019          10:31 AM  8:58 AM  8:58 AM 12:00 AM   Color       DARK YELLOW      Appearance       CLOUDY      Specific gravity      1.005 - 1.030   1.024      pH (UA)      5.0 - 8.0   7.5      Protein      NEG mg/dL TRACE (A)      Glucose      NEG mg/dL NEGATIVE      Ketone      NEG mg/dL 15 (A)      Bilirubin      NEG   SMALL (A)      Blood      NEG   NEGATIVE      Urobilinogen      0.2 - 1.0 EU/dL 1.0      Nitrites      NEG   NEGATIVE      Leukocyte Esterase      NEG   SMALL (A)      TSH      0.450 - 4.500 uIU/mL    1.590   Calcium      8.7 - 10.2 mg/dL   9.8    PTH, Intact      pg/mL  CANCELED       Component      Latest Ref Rng & Units 7/24/2019 7/24/2019 7/24/2019 7/24/2019          12:00 AM 12:00 AM 12:00 AM 12:00 AM   VITAMIN D, 25-HYDROXY      30.0 - 100.0 ng/mL    14.5 (L)   Folate      >3.0 ng/mL   14.1    Uric acid      2.5 - 7.1 mg/dL  3.1     Vitamin B12      232 - 1,245 pg/mL 348        Component      Latest Ref Rng & Units 4/5/2019 4/5/2019           1:20 PM  1:20 PM   Cholesterol, total      100 - 199 mg/dL 350 (H)    Triglyceride      0 - 149 mg/dL 90    HDL Cholesterol      >39 mg/dL 181    VLDL, calculated      5 - 40 mg/dL 18    LDL, calculated      0 - 99 mg/dL 151 (H)    Hemoglobin A1c, (calculated)      4.8 - 5.6 %  4.4 (L)   Estimated average glucose      mg/dL  80       Assessment/Plan:      1.  Hypercholesterolemia  - stopped Lipitor due to myalgia  - low fat diet  - repeat LIPID PANEL; Future  - METABOLIC PANEL, COMPREHENSIVE; Future    2. Vitamin D deficiency  - VITAMIN D, 25 HYDROXY; Future    3. Vitamin B12 deficiency  - VITAMIN B12; Future    4. Transaminitis  - Patient quit drinking alcohol.  - METABOLIC PANEL, COMPREHENSIVE; Future    5. Alcohol use disorder, mild, abuse  - commended for quitting drinking    6. Pancytopenia ([address]  - CBC WITH MANUAL DIFF; Future    7. Bilateral foot pain  - REFERRAL TO NEUROLOGY for evaluation of neuropathy    Follow-up and Dispositions    · Return in about 4 months (around 7/2/2020) for ROV. I have discussed the diagnosis with the patient and the intended plan as seen in the above orders. The patient has received an after-visit summary and questions were answered concerning future plans. I have discussed medication side effects and warnings with the patient as well. I have reviewed the plan of care with the patient, accepted their input and they are in agreement with the treatment goals.        Dhara Aceves MD  March 2, 2020

## 2020-03-02 NOTE — PROGRESS NOTES
Corie Maurer presents today for foot pain  - Is someone accompanying this pt? no  - Is the patient using any durable medical equipment during office visit? no    Coordination of Care:  1. Have you been to the ER, urgent care clinic since your last visit? Hospitalized since your last visit? Yes 12/20/20      2. Have you seen or consulted any other health care providers outside of the [address] [place] [address] since your last visit? Include any pap smears or colon screening. No      Depression Screening:  3 most recent PHQ Screens 10/2/2019   Little interest or pleasure in doing things Not at all   Feeling down, depressed, irritable, or hopeless Not at all   Total Score PHQ 2 0       Learning Assessment:  Learning Assessment 4/5/2019   PRIMARY LEARNER Patient   HIGHEST LEVEL OF EDUCATION - PRIMARY LEARNER  4 YEARS OF COLLEGE   BARRIERS PRIMARY LEARNER NONE   CO-LEARNER CAREGIVER No   PRIMARY LANGUAGE ENGLISH   LEARNER PREFERENCE PRIMARY LISTENING   ANSWERED BY patient   RELATIONSHIP SELF       Abuse Screening:  Abuse Screening Questionnaire 4/5/2019   Do you ever feel afraid of your partner? N   Are you in a relationship with someone who physically or mentally threatens you? N   Is it safe for you to go home? Y       Fall Risk  No flowsheet data found. Health Maintenance reviewed and discussed and ordered per Provider.   Health Maintenance Due   Topic Date Due    Pneumococcal 0-64 years (1 of 1 - PPSV23) 01/27/1974    DTaP/Tdap/Td series (1 - Tdap) 01/27/1979    PAP AKA CERVICAL CYTOLOGY  01/27/1989    Shingrix Vaccine Age 50> (1 of 2) 01/27/2018    FOBT Q1Y Age 50-75  01/27/2018

## 2020-04-29 ENCOUNTER — VIRTUAL VISIT (OUTPATIENT)
Dept: FAMILY MEDICINE CLINIC | Age: 50
End: 2020-04-29

## 2020-04-29 ENCOUNTER — TELEPHONE (OUTPATIENT)
Dept: FAMILY MEDICINE CLINIC | Age: 50
End: 2020-04-29

## 2020-04-29 NOTE — TELEPHONE ENCOUNTER
Patient stated that she was supposed to have an appointment with Dr. Codie Bender but was having issue. Pt added that she just gave up and decided to go to urgent care.

## 2020-11-18 ENCOUNTER — HOSPITAL ENCOUNTER (OUTPATIENT)
Dept: MAMMOGRAPHY | Age: 50
Discharge: HOME | End: 2020-11-18
Attending: OBSTETRICS & GYNECOLOGY
Payer: MEDICAID

## 2020-11-18 ENCOUNTER — HOSPITAL ENCOUNTER (OUTPATIENT)
Dept: ULTRASOUND IMAGING | Age: 50
Discharge: HOME | End: 2020-11-18
Attending: OBSTETRICS & GYNECOLOGY
Payer: MEDICAID

## 2020-11-18 DIAGNOSIS — N63.0 LUMP, BREAST: ICD-10-CM

## 2020-11-18 PROCEDURE — 76642 ULTRASOUND BREAST LIMITED: CPT

## 2020-11-18 PROCEDURE — 77062 BREAST TOMOSYNTHESIS BI: CPT

## 2021-01-14 ENCOUNTER — PATIENT MESSAGE (OUTPATIENT)
Dept: FAMILY MEDICINE CLINIC | Age: 51
End: 2021-01-14

## 2021-01-14 NOTE — TELEPHONE ENCOUNTER
Dear Kami Reveles      We are following up on the letter that  was sent to you  in December concerning Mammograms . We greatly appreciate  you helping us in obtaining this information. Enclosed was a Medical Release form. Please complete the top section , and include the facility where you had your mammogram performed. You can sign the form and upload document back to me. Thanks for your attention in this matter .       Janie [address] [place] [address]

## 2021-02-10 ENCOUNTER — OFFICE VISIT (OUTPATIENT)
Dept: FAMILY MEDICINE CLINIC | Age: 51
End: 2021-02-10
Payer: MEDICAID

## 2021-02-10 VITALS
HEART RATE: 102 BPM | BODY MASS INDEX: 21.49 KG/M2 | RESPIRATION RATE: 15 BRPM | WEIGHT: 129 LBS | OXYGEN SATURATION: 99 % | TEMPERATURE: 97.4 F | SYSTOLIC BLOOD PRESSURE: 120 MMHG | HEIGHT: 65 IN | DIASTOLIC BLOOD PRESSURE: 79 MMHG

## 2021-02-10 DIAGNOSIS — M79.672 BILATERAL FOOT PAIN: ICD-10-CM

## 2021-02-10 DIAGNOSIS — Z23 NEEDS FLU SHOT: Primary | ICD-10-CM

## 2021-02-10 DIAGNOSIS — E55.9 VITAMIN D DEFICIENCY: ICD-10-CM

## 2021-02-10 DIAGNOSIS — M79.671 BILATERAL FOOT PAIN: ICD-10-CM

## 2021-02-10 DIAGNOSIS — H60.311 ACUTE DIFFUSE OTITIS EXTERNA OF RIGHT EAR: ICD-10-CM

## 2021-02-10 DIAGNOSIS — Z12.11 SCREENING FOR MALIGNANT NEOPLASM OF COLON: ICD-10-CM

## 2021-02-10 DIAGNOSIS — E53.8 VITAMIN B12 DEFICIENCY: ICD-10-CM

## 2021-02-10 PROCEDURE — 99214 OFFICE O/P EST MOD 30 MIN: CPT | Performed by: INTERNAL MEDICINE

## 2021-02-10 PROCEDURE — 90000 INFLUENZA VIRUS VAC QUAD,SPLIT,PRESV FREE SYRINGE IM: CPT | Performed by: INTERNAL MEDICINE

## 2021-02-10 PROCEDURE — 90686 IIV4 VACC NO PRSV 0.5 ML IM: CPT | Performed by: INTERNAL MEDICINE

## 2021-02-10 RX ORDER — CIPROFLOXACIN AND DEXAMETHASONE 3; 1 MG/ML; MG/ML
4 SUSPENSION/ DROPS AURICULAR (OTIC) 2 TIMES DAILY
Qty: 7.5 ML | Refills: 0 | Status: SHIPPED | OUTPATIENT
Start: 2021-02-10 | End: 2021-02-20

## 2021-02-10 NOTE — PROGRESS NOTES
Chidi Him presents today for foot sensitivity, ear fullness and itchiness   - Is someone accompanying this pt? no  - Is the patient using any durable medical equipment during office visit? no    Coordination of Care:  1. Have you been to the ER, urgent care clinic since your last visit? Hospitalized since your last visit? Urgent Care, UTI     2. Have you seen or consulted any other health care providers outside of the [address] [place] [address] since your last visit? Include any pap smears or colon screening. No      Consent form signed and obtained from patient. Per verbal order from Dr. Al Rousseau- injection FLU administered. Verified x2 with BELLA Ron-Patient instructed to remain in clinic for 15 minutes and to report any adverse reactions immediately. Most recent VIS given. No adverse reactions noted after 15 minutes.

## 2021-02-10 NOTE — PROGRESS NOTES
History of Present Illness  Deepthi Peters is a 46 y.o. female who presents today for management of    Chief Complaint   Patient presents with    Ear Fullness    Foot Pain       Ear Pain  Patient complains of ear fullness. Symptoms include itching. . Onset of symptoms was a few years ago, gradually worsening since that time. Foot pain  Patient complains of bilaterally foot pain, localized to the whole foot. Onset of the symptoms was problem is longstanding. Current symptoms include increased sensitivity. .  Aggravating symptoms: any weight bearing. Patient's overall course: intermittent. Patient has had no prior foot problems. Previous visits for this problem: yes, last seen 1 year ago by the patient's PCP. Evaluation to date: plain films: normal. Treatment to date: none. Problem List  Patient Active Problem List    Diagnosis Date Noted    Vitamin B12 deficiency 03/02/2020    Transaminitis 03/02/2020    Bilateral foot pain 03/02/2020    Hypercholesterolemia 03/02/2020    Pancytopenia ([facility] [address] 03/02/2020    Vitamin D deficiency 08/01/2019    Tobacco use disorder 04/05/2019    Alcohol use disorder, mild, abuse 04/05/2019       Current Medications  Current Outpatient Medications   Medication Sig    ciprofloxacin-dexamethasone (CIPRODEX) 0.3-0.1 % otic suspension Administer 4 Drops in right ear two (2) times a day for 10 days. No current facility-administered medications for this visit. Allergies/Drug Reactions  Allergies   Allergen Reactions    Atorvastatin Myalgia    Bactrim [Sulfamethoprim] Rash    Penicillins Rash and Other (comments)        Review of Systems  Review of Systems   Constitutional: Negative for chills, fever, malaise/fatigue and weight loss. HENT: Positive for ear discharge and ear pain. Respiratory: Negative. Cardiovascular: Negative for chest pain, palpitations and leg swelling. Gastrointestinal: Negative. Neurological: Negative for dizziness and headaches. Psychiatric/Behavioral: Negative. Physical Exam  Vital signs:   Vitals:    02/10/21 0804   BP: 120/79   Pulse: (!) 102   Resp: 15   Temp: 97.4 °F (36.3 °C)   TempSrc: Temporal   SpO2: 99%   Weight: 129 lb (58.5 kg)   Height: 5' 5\" (1.651 m)       General: alert, oriented, not in distress  Head: scalp normal, atraumatic  Eyes: pupils are equal and reactive, full and intact EOM's  Ears: Left normal, Right erythema and yellowish discharge on the external ear canal  Extremities: no focal deformities, no edema  Skin: no active skin lesions      Assessment/Plan:    1. Acute diffuse otitis externa of right ear  - ciprofloxacin-dexamethasone (CIPRODEX) 0.3-0.1 % otic suspension; Administer 4 Drops in right ear two (2) times a day for 10 days. Dispense: 7.5 mL; Refill: 0    2. Bilateral foot pain  - Patient will call neurology to schedule a new-patient appointment. Information provided. 3. Needs flu shot  - INFLUENZA VIRUS VAC QUAD,SPLIT,PRESV FREE SYRINGE IM    4. Screening for malignant neoplasm of colon  - REFERRAL TO GASTROENTEROLOGY    5. Vitamin B12 deficiency  - start vitamin B12 1,000 mcg daily    6. Vitamin D deficiency  - start vitamin D 2,000 units daily      Follow-up and Dispositions    · Return in about 3 months (around 5/10/2021) for annual physical exam.           I have discussed the diagnosis with the patient and the intended plan as seen in the above orders. The patient has received an after-visit summary and questions were answered concerning future plans. I have discussed medication side effects and warnings with the patient as well. I have reviewed the plan of care with the patient, accepted their input and they are in agreement with the treatment goals.        Ta Boyle MD  February 10, 2021

## 2021-03-03 ENCOUNTER — TELEPHONE (OUTPATIENT)
Dept: FAMILY MEDICINE CLINIC | Age: 51
End: 2021-03-03

## 2021-03-03 DIAGNOSIS — E53.8 VITAMIN B12 DEFICIENCY: Primary | ICD-10-CM

## 2021-03-03 DIAGNOSIS — E55.9 VITAMIN D DEFICIENCY: ICD-10-CM

## 2021-03-05 RX ORDER — ACETAMINOPHEN 500 MG
2000 TABLET ORAL DAILY
Qty: 90 CAP | Refills: 3 | Status: SHIPPED | OUTPATIENT
Start: 2021-03-05 | End: 2022-02-08 | Stop reason: ALTCHOICE

## 2021-03-05 RX ORDER — LANOLIN ALCOHOL/MO/W.PET/CERES
1000 CREAM (GRAM) TOPICAL DAILY
Qty: 90 TAB | Refills: 3 | Status: SHIPPED | OUTPATIENT
Start: 2021-03-05

## 2021-03-05 NOTE — TELEPHONE ENCOUNTER
Please clarify with patient, per note: Patient will call neurology to schedule a new-patient appointment. Information provided. Lvm to return call.

## 2021-03-05 NOTE — TELEPHONE ENCOUNTER
cholecalciferol (VITAMIN D3) 1,000 unit tablet   cyanocobalamin (VITAMIN B12) 500 mcg tablet  Pt requesting refill. Nurse will refax referral. Please advise concerning medication refill.

## 2021-03-11 ENCOUNTER — TELEPHONE (OUTPATIENT)
Dept: FAMILY MEDICINE CLINIC | Age: 51
End: 2021-03-11

## 2021-03-11 DIAGNOSIS — M79.672 BILATERAL FOOT PAIN: Primary | ICD-10-CM

## 2021-03-11 DIAGNOSIS — M79.671 BILATERAL FOOT PAIN: Primary | ICD-10-CM

## 2021-03-11 NOTE — TELEPHONE ENCOUNTER
Pt. Stated the [facility] never received a referral. Is a new referral needed since it has been over a year?

## 2022-01-14 ENCOUNTER — OFFICE VISIT (OUTPATIENT)
Dept: FAMILY MEDICINE CLINIC | Age: 52
End: 2022-01-14
Payer: MEDICAID

## 2022-01-14 VITALS
HEIGHT: 65 IN | OXYGEN SATURATION: 98 % | DIASTOLIC BLOOD PRESSURE: 81 MMHG | TEMPERATURE: 98.2 F | SYSTOLIC BLOOD PRESSURE: 125 MMHG | WEIGHT: 124.6 LBS | BODY MASS INDEX: 20.76 KG/M2 | HEART RATE: 91 BPM | RESPIRATION RATE: 16 BRPM

## 2022-01-14 DIAGNOSIS — Z00.00 PREVENTATIVE HEALTH CARE: ICD-10-CM

## 2022-01-14 DIAGNOSIS — R10.12 ABDOMINAL PAIN, ACUTE, LEFT UPPER QUADRANT: ICD-10-CM

## 2022-01-14 DIAGNOSIS — Z01.818 PRE-OP EXAM: Primary | ICD-10-CM

## 2022-01-14 DIAGNOSIS — E53.8 VITAMIN B12 DEFICIENCY: ICD-10-CM

## 2022-01-14 DIAGNOSIS — F32.A ANXIETY AND DEPRESSION: ICD-10-CM

## 2022-01-14 DIAGNOSIS — E78.00 HYPERCHOLESTEROLEMIA: ICD-10-CM

## 2022-01-14 DIAGNOSIS — Z12.2 SCREENING FOR LUNG CANCER: ICD-10-CM

## 2022-01-14 DIAGNOSIS — Z23 NEEDS FLU SHOT: ICD-10-CM

## 2022-01-14 DIAGNOSIS — Z13.29 SCREENING FOR THYROID DISORDER: ICD-10-CM

## 2022-01-14 DIAGNOSIS — E55.9 VITAMIN D DEFICIENCY: ICD-10-CM

## 2022-01-14 DIAGNOSIS — Z12.11 SCREEN FOR COLON CANCER: ICD-10-CM

## 2022-01-14 DIAGNOSIS — F41.9 ANXIETY AND DEPRESSION: ICD-10-CM

## 2022-01-14 DIAGNOSIS — Z12.31 ENCOUNTER FOR SCREENING MAMMOGRAM FOR MALIGNANT NEOPLASM OF BREAST: ICD-10-CM

## 2022-01-14 DIAGNOSIS — R09.82 POST-NASAL DRIP: ICD-10-CM

## 2022-01-14 DIAGNOSIS — R06.83 SNORES: ICD-10-CM

## 2022-01-14 PROCEDURE — 90686 IIV4 VACC NO PRSV 0.5 ML IM: CPT | Performed by: NURSE PRACTITIONER

## 2022-01-14 PROCEDURE — 99214 OFFICE O/P EST MOD 30 MIN: CPT | Performed by: NURSE PRACTITIONER

## 2022-01-14 RX ORDER — ESCITALOPRAM OXALATE 5 MG/1
5 TABLET ORAL DAILY
Qty: 30 TABLET | Refills: 0 | Status: SHIPPED | OUTPATIENT
Start: 2022-01-14 | End: 2022-02-22 | Stop reason: SDUPTHER

## 2022-01-14 RX ORDER — CETIRIZINE HCL 10 MG
10 TABLET ORAL DAILY
Qty: 30 TABLET | Refills: 1 | Status: SHIPPED | COMMUNITY
Start: 2022-01-14 | End: 2022-04-28 | Stop reason: SDUPTHER

## 2022-01-14 RX ORDER — FLUTICASONE PROPIONATE 50 MCG
2 SPRAY, SUSPENSION (ML) NASAL
Qty: 1 EACH | Refills: 2
Start: 2022-01-14

## 2022-01-14 NOTE — PROGRESS NOTES
OFFICE NOTE    Benigno Blank is a 45 y.o. female presenting today for office visit. Patient Active Problem List   Diagnosis Code    Tobacco use disorder F17.200    Alcohol use disorder, mild, abuse F10.10    Vitamin D deficiency E55.9    Vitamin B12 deficiency E53.8    Transaminitis R74.01    Bilateral foot pain M79.671, M79.672    Hypercholesterolemia E78.00    Pancytopenia [facility]) D61.818     1/27/2022  9:13 AM    Chief Complaint   Patient presents with    Establish Care    Pre-op Exam    Depression    Side Pain    Sleep Problem       HPI:   Previous Dr. Rose Tidwell patient. Patient looks well. She gets frustrated at times with her 2 teenagers and leads to anxiety. Patient negative for suicidal or homicidal thoughts. Patient agreed to Lexapro 5 mg daily. Patient is frustrated with her vision and dealing with covid. Patient snores. Ordered sleep study. She has left intermittent upper quadrant pain for a month, crampy does not radiate. Ordered ultrasound of upper left quadrant. She thinks she had covid 1/2/2022 and had shortness of breath, fever and GI symptoms with vomiting. She was expsoed to her ex- who tested positive as well as her two teenagers after Juli Patient's symptoms have resolved. In office visit related to establishing care with new provider and pre op exam.     She is having cataract surgery on her left eye with [facility]. Patients last menstrual period was, she has stopped and post menopausal.        Smokes tobacco a pack/day, drinks alcohol reformer issue not drinking now and no illicit drugs or marijuana. Patient reports appetite is good, no urinary issues, broken sleep, snores and regular bowel movements. Patient denies fever, chills, chest pain, shortness of breath, abdomen pain or dark tarry stool.     Covid vaccine done and booster  Flu vaccine today  Shingles at pharmacy   Pap smear, has a GYN provider   Breast cancer screening, ordered   Colorectal cancer screening, ordered GI referral    ROS:    · General: negative for - chills, fever, weight changes or malaise  · HEENT: no sore throat, nasal congestion, vision problems or ear problems  · Respiratory: no cough, shortness of breath, or wheezing  · Cardiovascular: no chest pain, palpitations, or dyspnea on exertion  · Gastrointestinal: Intermittent left upper abdominal pain at times, negative for N/V, change in bowel habits, or black or bloody stools  · Musculoskeletal: no back pain, joint pain, joint stiffness, muscle pain or muscle weakness  · Neurological: no numbness, tingling, headache or dizziness  · Endo:  No polyuria or polydipsia. · : no hematuria, dysuria, frequency, hesitancy, or nocturia.     · Skin: No itching or rash, no open skin, no unusual lesions   · Psychological: negative for - anxiety, depression, sleep disturbances, suicidal or homicidal ideations     PHQ Screening   3 most recent PHQ Screens 2022   Little interest or pleasure in doing things Nearly every day   Feeling down, depressed, irritable, or hopeless More than half the days   Total Score PHQ 2 5   Trouble falling or staying asleep, or sleeping too much Not at all   Feeling tired or having little energy Not at all   Poor appetite, weight loss, or overeating Not at all   Feeling bad about yourself - or that you are a failure or have let yourself or your family down Not at all   Trouble concentrating on things such as school, work, reading, or watching TV Nearly every day   Moving or speaking so slowly that other people could have noticed; or the opposite being so fidgety that others notice Not at all   Thoughts of being better off dead, or hurting yourself in some way Not at all   PHQ 9 Score 8       History  Past Medical History:   Diagnosis Date    Fibroadenoma of breast, right     Malignant melanoma of forehead ([facility] [address]        Past Surgical History:   Procedure Laterality Date    HX  SECTION      HX CYST INCISION AND DRAINAGE Left     Cyst removed       Social History     Socioeconomic History    Marital status: LEGALLY      Spouse name: Not on file    Number of children: Not on file    Years of education: Not on file    Highest education level: Not on file   Occupational History    Not on file   Tobacco Use    Smoking status: Current Every Day Smoker     Packs/day: 1.00     Years: 14.00     Pack years: 14.00     Types: Cigarettes    Smokeless tobacco: Never Used   Substance and Sexual Activity    Alcohol use: Not Currently     Comment: Last drink was 5/2019. Previously heavy drinker.  Drug use: Never    Sexual activity: Not Currently   Other Topics Concern    Not on file   Social History Narrative    Not on file     Social Determinants of Health     Financial Resource Strain:     Difficulty of Paying Living Expenses: Not on file   Food Insecurity:     Worried About Running Out of Food in the Last Year: Not on file    Irvin of Food in the Last Year: Not on file   Transportation Needs:     Lack of Transportation (Medical): Not on file    Lack of Transportation (Non-Medical):  Not on file   Physical Activity:     Days of Exercise per Week: Not on file    Minutes of Exercise per Session: Not on file   Stress:     Feeling of Stress : Not on file   Social Connections:     Frequency of Communication with Friends and Family: Not on file    Frequency of Social Gatherings with Friends and Family: Not on file    Attends [employer] Services: Not on file    Active Member of Clubs or Organizations: Not on file    Attends Club or Organization Meetings: Not on file    Marital Status: Not on file   Intimate Partner Violence:     Fear of Current or Ex-Partner: Not on file    Emotionally Abused: Not on file    Physically Abused: Not on file    Sexually Abused: Not on file   Housing Stability:     Unable to Pay for Housing in the Last Year: Not on file    Number of Places Lived in the Last Year: Not on file    Unstable Housing in the Last Year: Not on file       Allergies   Allergen Reactions    Atorvastatin Myalgia    Bactrim [Sulfamethoprim] Rash    Penicillins Rash and Other (comments)       Current Outpatient Medications   Medication Sig Dispense Refill    escitalopram oxalate (LEXAPRO) 5 mg tablet Take 1 Tablet by mouth daily. 30 Tablet 0    fluticasone propionate (Flonase Allergy Relief) 50 mcg/actuation nasal spray 2 Sprays by Both Nostrils route daily as needed for Rhinitis or Allergies. 1 Each 2    cetirizine (ZYRTEC) 10 mg tablet Take 1 Tablet by mouth daily. 30 Tablet 1    cholecalciferol (VITAMIN D3) (2,000 UNITS /50 MCG) cap capsule Take 1 Cap by mouth daily. 90 Cap 3    cyanocobalamin (Vitamin B-12) 1,000 mcg tablet Take 1 Tab by mouth daily. 90 Tab 3       Patient Care Team:  Patient Care Team:  Sariah Mata NP as PCP - General (Nurse Practitioner)  Sariah Mata NP as PCP [facility] Empaneled Provider    Physical Exam  Patient appears well, she is pleasant, alert, oriented x 3, in no distress. ENT normal.  Neck supple. No adenopathy or thyromegaly. FROYLAN. Lungs are clear, good air entry, no wheezes, rhonchi or rales. Cardiovascular, S1 and S2 normal, no murmurs, regular rate and rhythm. No LAD. Chest wall negative for tenderness  Abdomen is soft without tenderness  /Anorectal, deferred. Muscleskeletal, no swelling  Extremities show no edema  Neurological is normal without focal findings. Skin: no concerning lesions. Psych: normal affect. Mood good. Oriented x 3. Judgement and insight intact.       Vitals:    01/14/22 1132 01/14/22 1135   BP: (!) 132/95 125/81   Pulse: 91    Resp: 16    Temp: 98.2 °F (36.8 °C)    TempSrc: Temporal    SpO2: 98%    Weight: 124 lb 9.6 oz (56.5 kg)    Height: 5' 5\" (1.651 m)    PainSc:   2        Assessment and Plan    Diagnoses and all orders for this visit:    Pre-op exam    Hypercholesterolemia  - LIPID PANEL; Future  -     LIPID PANEL    Preventative health care  -     CBC WITH AUTOMATED DIFF; Future  -     METABOLIC PANEL, COMPREHENSIVE; Future  -     URINALYSIS W/ RFLX MICROSCOPIC; Future  -     VITAMIN D, 25 HYDROXY; Future  -     VITAMIN D, 25 HYDROXY  -     URINALYSIS W/ RFLX MICROSCOPIC  -     METABOLIC PANEL, COMPREHENSIVE  -     CBC WITH AUTOMATED DIFF    Screening for thyroid disorder  -     T4, FREE; Future  -     TSH 3RD GENERATION; Future  -     TSH 3RD GENERATION  -     T4, FREE    Vitamin D deficiency  -     VITAMIN D, 25 HYDROXY; Future  -     VITAMIN D, 25 HYDROXY    Vitamin B12 deficiency  -     CBC WITH AUTOMATED DIFF; Future  -     CBC WITH AUTOMATED DIFF    Needs flu shot  -     INFLUENZA VIRUS VAC QUAD,SPLIT,PRESV FREE SYRINGE IM    Encounter for screening mammogram for malignant neoplasm of breast  -     KRISH 3D ROD W MAMMO BI SCREENING INCL CAD; Future    Screening for lung cancer  -     CT LOW DOSE LUNG CANCER SCREENING; Future    Screen for colon cancer  -     REFERRAL TO GASTROENTEROLOGY    Anxiety and depression  -     escitalopram oxalate (LEXAPRO) 5 mg tablet; Take 1 Tablet by mouth daily. , Normal, Disp-30 Tablet, R-0    Snores  -     REFERRAL TO SLEEP STUDIES    Post-nasal drip  -     fluticasone propionate (Flonase Allergy Relief) 50 mcg/actuation nasal spray; 2 Sprays by Both Nostrils route daily as needed for Rhinitis or Allergies. , No Print, Disp-1 Each, R-2  -     cetirizine (ZYRTEC) 10 mg tablet; Take 1 Tablet by mouth daily. , Mail Order, Disp-30 Tablet, R-1    Abdominal pain, acute, left upper quadrant  -     US ABD LTD; Future    Other orders  -     MICROSCOPIC EXAMINATION  -     CVD REPORT         *Plan of care reviewed with patient. Patient in agreement with plan and expresses understanding. All questions answered and patient encouraged to call or RTO if further questions or concerns. 50% of 30 minutes spent on counseling and managing her care.     Follow-up and Dispositions    · Return in about 3 weeks (around 2/4/2022) for Follow-up for anxiety and possible depression.        YULISSA DominguezC

## 2022-01-14 NOTE — PROGRESS NOTES
Samara Severin is a 46 y.o. female (: 1970) presenting to address:    Chief Complaint   Patient presents with    Establish Care    Pre-op Exam    Depression    Side Pain    Sleep Problem       Vitals:    22 1132   Resp: 16   Temp: 98.2 °F (36.8 °C)   TempSrc: Temporal   Weight: 124 lb 9.6 oz (56.5 kg)   Height: 5' 5\" (1.651 m)   PainSc:   2       Hearing/Vision:   No exam data present    Learning Assessment:     Learning Assessment 2019   PRIMARY LEARNER Patient   HIGHEST LEVEL OF EDUCATION - PRIMARY LEARNER  4 YEARS OF COLLEGE   BARRIERS PRIMARY LEARNER NONE   CO-LEARNER CAREGIVER No   PRIMARY LANGUAGE ENGLISH   LEARNER PREFERENCE PRIMARY LISTENING   ANSWERED BY patient   RELATIONSHIP SELF     Depression Screening:     3 most recent PHQ Screens 2022   Little interest or pleasure in doing things Nearly every day   Feeling down, depressed, irritable, or hopeless More than half the days   Total Score PHQ 2 5   Trouble falling or staying asleep, or sleeping too much Not at all   Feeling tired or having little energy Not at all   Poor appetite, weight loss, or overeating Not at all   Feeling bad about yourself - or that you are a failure or have let yourself or your family down Not at all   Trouble concentrating on things such as school, work, reading, or watching TV Nearly every day   Moving or speaking so slowly that other people could have noticed; or the opposite being so fidgety that others notice Not at all   Thoughts of being better off dead, or hurting yourself in some way Not at all   PHQ 9 Score 8     Fall Risk Assessment:     Fall Risk Assessment, last 12 mths 2022   Able to walk? Yes   Fall in past 12 months? 0   Do you feel unsteady? 0   Are you worried about falling 0     Abuse Screening:     Abuse Screening Questionnaire 2022   Do you ever feel afraid of your partner? N   Are you in a relationship with someone who physically or mentally threatens you?  N   Is it safe for you to go home? Y     ADL Assessment:   No flowsheet data found. Coordination of Care Questionaire:   1. Have you been to the ER, urgent care clinic since your last visit? Hospitalized since your last visit? NO    2. Have you seen or consulted any other health care providers outside of the [address] [place] [address] since your last visit? Include any pap smears or colon screening.  NO

## 2022-01-14 NOTE — PROGRESS NOTES
OFFICE NOTE    Selma Kendrick is a 46 y.o. female presenting today for office visit. Patient Active Problem List   Diagnosis Code    Tobacco use disorder F17.200    Alcohol use disorder, mild, abuse F10.10    Vitamin D deficiency E55.9    Vitamin B12 deficiency E53.8    Transaminitis R74.01    Bilateral foot pain M79.671, M79.672    Hypercholesterolemia E78.00    Pancytopenia [facility]) D61.818     1/14/2022  9:13 AM    Chief Complaint   Patient presents with    Establish Care    Pre-op Exam    Depression    Side Pain    Sleep Problem       HPI:   Previous Dr. Mariella Alegria patient. Patient is frustrated with her vision and dealing with covid. She thinks she had covid 1/2/2022 and had shortness of breath, fever and GI symptoms with vomiting. She was expsoed to her ex- who tested positive as well as her two teenagers after [place] Patient's symptoms have resolved. In office visit related to establishing care with new provider and pre op exam.     She is having cataract surgery on her left [facility]. Patients last menstrual period was, she has stopped and post menopausal.        Smokes tobacco smokes a pack/day, drinks alcohol reformer issue not drinking now and no illicit drugs or marijuana. Sexually active  single children lives    Patient reports appetite is good, no urinary issues, sleeps well and regular bowel movements. Patient denies fever, chills, chest pain, shortness of breath, abdomen pain or dark tarry stool.     Covid vaccine done and booster  Flu vaccine today  Shingles at pharmacy   Pap smear, has a GYN provider   Breast cancer screening, ordered   Colorectal cancer screening, ordered GI referral    ROS:    · General: negative for - chills, fever, weight changes or malaise  · HEENT: no sore throat, nasal congestion, vision problems or ear problems  · Respiratory: no cough, shortness of breath, or wheezing  · Cardiovascular: no chest pain, palpitations, or dyspnea on exertion  · Gastrointestinal: no abdominal pain, N/V, change in bowel habits, or black or bloody stools  · Musculoskeletal: no back pain, joint pain, joint stiffness, muscle pain or muscle weakness  · Neurological: no numbness, tingling, headache or dizziness  · Endo:  No polyuria or polydipsia. · : no hematuria, dysuria, frequency, hesitancy, or nocturia.     · Skin: No itching or rash, no open skin, no unusual lesions   · Psychological: negative for - anxiety, depression, sleep disturbances, suicidal or homicidal ideations     PHQ Screening   3 most recent PHQ Screens 2022   Little interest or pleasure in doing things Nearly every day   Feeling down, depressed, irritable, or hopeless More than half the days   Total Score PHQ 2 5   Trouble falling or staying asleep, or sleeping too much Not at all   Feeling tired or having little energy Not at all   Poor appetite, weight loss, or overeating Not at all   Feeling bad about yourself - or that you are a failure or have let yourself or your family down Not at all   Trouble concentrating on things such as school, work, reading, or watching TV Nearly every day   Moving or speaking so slowly that other people could have noticed; or the opposite being so fidgety that others notice Not at all   Thoughts of being better off dead, or hurting yourself in some way Not at all   PHQ 9 Score 8       History  Past Medical History:   Diagnosis Date    Fibroadenoma of breast, right     Malignant melanoma of forehead ([address]        Past Surgical History:   Procedure Laterality Date    HX  SECTION      HX CYST INCISION AND DRAINAGE Left     Cyst removed       Social History     Socioeconomic History    Marital status: LEGALLY      Spouse name: Not on file    Number of children: Not on file    Years of education: Not on file    Highest education level: Not on file   Occupational History    Not on file   Tobacco Use    Smoking status: Current Every Day Smoker     Packs/day: 1.00     Years: 14.00     Pack years: 14.00     Types: Cigarettes    Smokeless tobacco: Never Used   Substance and Sexual Activity    Alcohol use: Not Currently     Comment: Last drink was 5/2019. Previously heavy drinker.  Drug use: Never    Sexual activity: Not Currently   Other Topics Concern    Not on file   Social History Narrative    Not on file     Social Determinants of Health     Financial Resource Strain:     Difficulty of Paying Living Expenses: Not on file   Food Insecurity:     Worried About Running Out of Food in the Last Year: Not on file    Irvin of Food in the Last Year: Not on file   Transportation Needs:     Lack of Transportation (Medical): Not on file    Lack of Transportation (Non-Medical):  Not on file   Physical Activity:     Days of Exercise per Week: Not on file    Minutes of Exercise per Session: Not on file   Stress:     Feeling of Stress : Not on file   Social Connections:     Frequency of Communication with Friends and Family: Not on file    Frequency of Social Gatherings with Friends and Family: Not on file    Attends Spiritism Services: Not on file    Active Member of [address] [place] [address] [employer] or Organizations: Not on file    Attends Club or Organization Meetings: Not on file    Marital Status: Not on file   Intimate Partner Violence:     Fear of Current or Ex-Partner: Not on file    Emotionally Abused: Not on file    Physically Abused: Not on file    Sexually Abused: Not on file   Housing Stability:     Unable to Pay for Housing in the Last Year: Not on file    Number of [place] in the Last Year: Not on file    Unstable Housing in the Last Year: Not on file       Allergies   Allergen Reactions    Atorvastatin Myalgia    Bactrim [Sulfamethoprim] Rash    Penicillins Rash and Other (comments)       Current Outpatient Medications   Medication Sig Dispense Refill    escitalopram oxalate (LEXAPRO) 5 mg tablet Take 1 Tablet by mouth daily. 30 Tablet 0    fluticasone propionate (Flonase Allergy Relief) 50 mcg/actuation nasal spray 2 Sprays by Both Nostrils route daily as needed for Rhinitis or Allergies. 1 Each 2    cetirizine (ZYRTEC) 10 mg tablet Take 1 Tablet by mouth daily. 30 Tablet 1    cholecalciferol (VITAMIN D3) (2,000 UNITS /50 MCG) cap capsule Take 1 Cap by mouth daily. 90 Cap 3    cyanocobalamin (Vitamin B-12) 1,000 mcg tablet Take 1 Tab by mouth daily. 90 Tab 3       Patient Care Team:  Patient Care Team:  Pina Saez NP as PCP - General (Nurse Practitioner)  Sarahy Haq MD as PCP - [facility] Provider    LABS:  None new to review    RADIOLOGY:  None new to review    Physical Exam  Patient appears well, she is pleasant, alert, oriented x 3, in no distress. ENT normal.  Neck supple. No adenopathy or thyromegaly. FROYLAN. Lungs are clear, good air entry, no wheezes, rhonchi or rales. Cardiovascular, S1 and S2 normal, no murmurs, regular rate and rhythm. No LAD. Chest wall negative for tenderness  Abdomen is soft without tenderness, guarding, mass or organomegaly. /Anorectal, deferred. Muscleskeletal, no swelling, no tenderness, no injury. Extremities show no edema, normal peripheral pulses. Neurological is normal without focal findings. Skin: no concerning lesions. Psych: normal affect. Mood good. Oriented x 3. Judgement and insight intact. Vitals:    01/14/22 1132 01/14/22 1135   BP: (!) 132/95 125/81   Pulse: 91    Resp: 16    Temp: 98.2 °F (36.8 °C)    TempSrc: Temporal    SpO2: 98%    Weight: 124 lb 9.6 oz (56.5 kg)    Height: 5' 5\" (1.651 m)    PainSc:   2        Assessment and Plan    Diagnoses and all orders for this visit:    Pre-op exam    Hypercholesterolemia  -     LIPID PANEL; Future  -     LIPID PANEL    Preventative health care  -     CBC WITH AUTOMATED DIFF; Future  -     METABOLIC PANEL, COMPREHENSIVE; Future  -     URINALYSIS W/ RFLX MICROSCOPIC;  Future  - VITAMIN D, 25 HYDROXY; Future  -     VITAMIN D, 25 HYDROXY  -     URINALYSIS W/ RFLX MICROSCOPIC  -     METABOLIC PANEL, COMPREHENSIVE  -     CBC WITH AUTOMATED DIFF    Screening for thyroid disorder  -     T4, FREE; Future  -     TSH 3RD GENERATION; Future  -     TSH 3RD GENERATION  -     T4, FREE    Vitamin D deficiency  -     VITAMIN D, 25 HYDROXY; Future  -     VITAMIN D, 25 HYDROXY    Vitamin B12 deficiency  -     CBC WITH AUTOMATED DIFF; Future  -     CBC WITH AUTOMATED DIFF    Needs flu shot  -     INFLUENZA VIRUS VAC QUAD,SPLIT,PRESV FREE SYRINGE IM    Encounter for screening mammogram for malignant neoplasm of breast  -     [facility] 3D ROD W MAMMO BI SCREENING INCL CAD; Future    Screening for lung cancer  -     CT LOW DOSE LUNG CANCER SCREENING; Future    Screen for colon cancer  -     REFERRAL TO GASTROENTEROLOGY    Anxiety and depression  -     escitalopram oxalate (LEXAPRO) 5 mg tablet; Take 1 Tablet by mouth daily. , Normal, Disp-30 Tablet, R-0    Snores  -     REFERRAL TO SLEEP STUDIES    Post-nasal drip  -     fluticasone propionate (Flonase Allergy Relief) 50 mcg/actuation nasal spray; 2 Sprays by Both Nostrils route daily as needed for Rhinitis or Allergies. , No Print, Disp-1 Each, R-2  -     cetirizine (ZYRTEC) 10 mg tablet; Take 1 Tablet by mouth daily. , Mail Order, Disp-30 Tablet, R-1         *Plan of care reviewed with patient. Patient in agreement with plan and expresses understanding. All questions answered and patient encouraged to call or RTO if further questions or concerns. 50% of 30 minutes spent on counseling and managing her care.       Ivan Jones, ROSE-C

## 2022-01-15 LAB
25(OH)D3+25(OH)D2 SERPL-MCNC: 28.1 NG/ML (ref 30–100)
ALBUMIN SERPL-MCNC: 4.4 G/DL (ref 3.8–4.9)
ALBUMIN/GLOB SERPL: 2.2 {RATIO} (ref 1.2–2.2)
ALP SERPL-CCNC: 63 IU/L (ref 44–121)
ALT SERPL-CCNC: 55 IU/L (ref 0–32)
APPEARANCE UR: CLEAR
AST SERPL-CCNC: 25 IU/L (ref 0–40)
BACTERIA #/AREA URNS HPF: NORMAL /[HPF]
BASOPHILS # BLD AUTO: 0.1 X10E3/UL (ref 0–0.2)
BASOPHILS NFR BLD AUTO: 1 %
BILIRUB SERPL-MCNC: 0.3 MG/DL (ref 0–1.2)
BILIRUB UR QL STRIP: NEGATIVE
BUN SERPL-MCNC: 7 MG/DL (ref 6–24)
BUN/CREAT SERPL: 14 (ref 9–23)
CALCIUM SERPL-MCNC: 9.5 MG/DL (ref 8.7–10.2)
CASTS URNS QL MICRO: NORMAL /LPF
CHLORIDE SERPL-SCNC: 104 MMOL/L (ref 96–106)
CHOLEST SERPL-MCNC: 207 MG/DL (ref 100–199)
CO2 SERPL-SCNC: 22 MMOL/L (ref 20–29)
COLOR UR: YELLOW
CREAT SERPL-MCNC: 0.5 MG/DL (ref 0.57–1)
EOSINOPHIL # BLD AUTO: 0.1 X10E3/UL (ref 0–0.4)
EOSINOPHIL NFR BLD AUTO: 2 %
EPI CELLS #/AREA URNS HPF: NORMAL /HPF (ref 0–10)
ERYTHROCYTE [DISTWIDTH] IN BLOOD BY AUTOMATED COUNT: 11.5 % (ref 11.7–15.4)
GLOBULIN SER CALC-MCNC: 2 G/DL (ref 1.5–4.5)
GLUCOSE SERPL-MCNC: 115 MG/DL (ref 65–99)
GLUCOSE UR QL: NEGATIVE
HCT VFR BLD AUTO: 35.8 % (ref 34–46.6)
HDLC SERPL-MCNC: 85 MG/DL
HGB BLD-MCNC: 12 G/DL (ref 11.1–15.9)
HGB UR QL STRIP: NEGATIVE
IMM GRANULOCYTES # BLD AUTO: 0 X10E3/UL (ref 0–0.1)
IMM GRANULOCYTES NFR BLD AUTO: 0 %
IMP & REVIEW OF LAB RESULTS: NORMAL
KETONES UR QL STRIP: NEGATIVE
LDLC SERPL CALC-MCNC: 104 MG/DL (ref 0–99)
LEUKOCYTE ESTERASE UR QL STRIP: ABNORMAL
LYMPHOCYTES # BLD AUTO: 1.6 X10E3/UL (ref 0.7–3.1)
LYMPHOCYTES NFR BLD AUTO: 39 %
MCH RBC QN AUTO: 33.6 PG (ref 26.6–33)
MCHC RBC AUTO-ENTMCNC: 33.5 G/DL (ref 31.5–35.7)
MCV RBC AUTO: 100 FL (ref 79–97)
MICRO URNS: ABNORMAL
MONOCYTES # BLD AUTO: 0.6 X10E3/UL (ref 0.1–0.9)
MONOCYTES NFR BLD AUTO: 15 %
NEUTROPHILS # BLD AUTO: 1.8 X10E3/UL (ref 1.4–7)
NEUTROPHILS NFR BLD AUTO: 43 %
NITRITE UR QL STRIP: NEGATIVE
PH UR STRIP: 7 [PH] (ref 5–7.5)
PLATELET # BLD AUTO: 289 X10E3/UL (ref 150–450)
POTASSIUM SERPL-SCNC: 3.5 MMOL/L (ref 3.5–5.2)
PROT SERPL-MCNC: 6.4 G/DL (ref 6–8.5)
PROT UR QL STRIP: NEGATIVE
RBC # BLD AUTO: 3.57 X10E6/UL (ref 3.77–5.28)
RBC #/AREA URNS HPF: NORMAL /HPF (ref 0–2)
SODIUM SERPL-SCNC: 139 MMOL/L (ref 134–144)
SP GR UR: 1.01 (ref 1–1.03)
T4 FREE SERPL-MCNC: 1.3 NG/DL (ref 0.82–1.77)
TRIGL SERPL-MCNC: 102 MG/DL (ref 0–149)
TSH SERPL DL<=0.005 MIU/L-ACNC: 1.29 UIU/ML (ref 0.45–4.5)
UROBILINOGEN UR STRIP-MCNC: 0.2 MG/DL (ref 0.2–1)
VLDLC SERPL CALC-MCNC: 18 MG/DL (ref 5–40)
WBC # BLD AUTO: 4.2 X10E3/UL (ref 3.4–10.8)
WBC #/AREA URNS HPF: NORMAL /HPF (ref 0–5)

## 2022-02-08 DIAGNOSIS — E55.9 VITAMIN D DEFICIENCY: Primary | ICD-10-CM

## 2022-02-08 RX ORDER — ERGOCALCIFEROL 1.25 MG/1
50000 CAPSULE ORAL DAILY
Qty: 8 CAPSULE | Refills: 0 | Status: SHIPPED | OUTPATIENT
Start: 2022-02-08

## 2022-02-08 NOTE — PROGRESS NOTES
Vitamin D low. I'll order you high dose vit D weekly for 8 weeks. CMP unremarkable. CBC unremarkable. Cholesterol slightly high. You had some bacteria in your urine but we don't treat unless you're having symptoms.

## 2022-02-09 ENCOUNTER — HOSPITAL ENCOUNTER (OUTPATIENT)
Dept: WOMENS IMAGING | Age: 52
Discharge: HOME | End: 2022-02-09
Attending: NURSE PRACTITIONER
Payer: MEDICAID

## 2022-02-09 DIAGNOSIS — Z12.31 ENCOUNTER FOR SCREENING MAMMOGRAM FOR MALIGNANT NEOPLASM OF BREAST: ICD-10-CM

## 2022-02-09 PROCEDURE — 77063 BREAST TOMOSYNTHESIS BI: CPT

## 2022-02-10 ENCOUNTER — TELEPHONE (OUTPATIENT)
Dept: FAMILY MEDICINE CLINIC | Age: 52
End: 2022-02-10

## 2022-02-11 ENCOUNTER — NURSE NAVIGATOR (OUTPATIENT)
Dept: OTHER | Age: 52
End: 2022-02-11

## 2022-02-11 NOTE — PROGRESS NOTES
Referring Provider: Vivien Gaytan NP      Lung Cancer Risk Profile:   Age:   Gender: Female  Height: 65\"  Weight: 124#    Smoking History:  Smoking Status: current use  # years smokin  # years quit: 0  Packs/day: 1  Pack years: 28    Patient discussed smoking cessation with PCP: Yes, per provider order    Patient participated in shared decision making process with PCP: Yes, per provider order    Patient is currently experiencing symptoms: No    If yes what symptoms:     Co-Morbidities:      Cancer History:      Additional Risk Factors:          Patient's smoking history verified via provider note. Patient meets LDCT lung cancer screening criteria.        NORMA CoffeyN, RN, OCN  Lung Health Nurse Navigator

## 2022-02-14 ENCOUNTER — HOSPITAL ENCOUNTER (OUTPATIENT)
Dept: CT IMAGING | Age: 52
Discharge: HOME | End: 2022-02-14
Attending: NURSE PRACTITIONER
Payer: MEDICAID

## 2022-02-14 VITALS — HEIGHT: 65 IN | BODY MASS INDEX: 20.66 KG/M2 | WEIGHT: 124 LBS

## 2022-02-14 DIAGNOSIS — Z12.2 SCREENING FOR LUNG CANCER: ICD-10-CM

## 2022-02-14 PROCEDURE — 71271 CT THORAX LUNG CANCER SCR C-: CPT

## 2022-03-18 PROBLEM — E78.00 HYPERCHOLESTEROLEMIA: Status: ACTIVE | Noted: 2020-03-02

## 2022-03-19 PROBLEM — R74.01 TRANSAMINITIS: Status: ACTIVE | Noted: 2020-03-02

## 2022-03-19 PROBLEM — D61.818 PANCYTOPENIA (HCC): Status: ACTIVE | Noted: 2020-03-02

## 2022-03-19 PROBLEM — E53.8 VITAMIN B12 DEFICIENCY: Status: ACTIVE | Noted: 2020-03-02

## 2022-03-19 PROBLEM — M79.671 BILATERAL FOOT PAIN: Status: ACTIVE | Noted: 2020-03-02

## 2022-03-19 PROBLEM — F10.10 ALCOHOL USE DISORDER, MILD, ABUSE: Status: ACTIVE | Noted: 2019-04-05

## 2022-03-19 PROBLEM — F17.200 TOBACCO USE DISORDER: Status: ACTIVE | Noted: 2019-04-05

## 2022-03-19 PROBLEM — E55.9 VITAMIN D DEFICIENCY: Status: ACTIVE | Noted: 2019-08-01

## 2022-03-19 PROBLEM — M79.672 BILATERAL FOOT PAIN: Status: ACTIVE | Noted: 2020-03-02

## 2022-04-28 DIAGNOSIS — R09.82 POST-NASAL DRIP: ICD-10-CM

## 2022-04-30 RX ORDER — CETIRIZINE HCL 10 MG
10 TABLET ORAL DAILY
Qty: 90 TABLET | Refills: 1 | Status: SHIPPED | OUTPATIENT
Start: 2022-04-30

## 2022-05-06 DIAGNOSIS — R09.82 POST-NASAL DRIP: ICD-10-CM

## 2022-05-08 RX ORDER — CETIRIZINE HCL 10 MG
10 TABLET ORAL DAILY
Qty: 90 TABLET | Refills: 1 | OUTPATIENT
Start: 2022-05-08

## 2022-06-23 DIAGNOSIS — F41.9 ANXIETY AND DEPRESSION: ICD-10-CM

## 2022-06-23 DIAGNOSIS — F32.A ANXIETY AND DEPRESSION: ICD-10-CM

## 2022-06-23 NOTE — TELEPHONE ENCOUNTER
Pt states she is out of medication and needs a refill asap. Requested Prescriptions     Pending Prescriptions Disp Refills    escitalopram oxalate (LEXAPRO) 5 mg tablet 90 Tablet 0     Sig: Take 1 Tablet by mouth daily.

## 2022-06-24 RX ORDER — ESCITALOPRAM OXALATE 5 MG/1
5 TABLET ORAL DAILY
Qty: 90 TABLET | Refills: 0 | Status: SHIPPED | OUTPATIENT
Start: 2022-06-24 | End: 2022-09-26 | Stop reason: SDUPTHER

## 2022-09-26 DIAGNOSIS — F32.A ANXIETY AND DEPRESSION: ICD-10-CM

## 2022-09-26 DIAGNOSIS — F41.9 ANXIETY AND DEPRESSION: ICD-10-CM

## 2022-09-26 NOTE — TELEPHONE ENCOUNTER
**PATIENT CURRENTLY OUT OF MEDICATION DUE PERSONAL FAMILY ISSUE**    PLEASE FILL ASAP. This patient contacted the office for the following prescriptions to be refilled:    Medication requested :   Requested Prescriptions     Pending Prescriptions Disp Refills    escitalopram oxalate (LEXAPRO) 5 mg tablet 90 Tablet 0     Sig: Take 1 Tablet by mouth daily. PCP: Noa Carmichael NP  LOV: 1/14/2022 NOV DMA: Visit date not found  FUTURE APPT: No future appointments. Thank you.

## 2022-09-28 RX ORDER — ESCITALOPRAM OXALATE 5 MG/1
5 TABLET ORAL DAILY
Qty: 90 TABLET | Refills: 0 | Status: SHIPPED | OUTPATIENT
Start: 2022-09-28

## 2023-01-03 ENCOUNTER — TRANSCRIBE ORDER (OUTPATIENT)
Dept: SCHEDULING | Age: 53
End: 2023-01-03

## 2023-01-03 DIAGNOSIS — Z12.31 ENCOUNTER FOR SCREENING MAMMOGRAM FOR MALIGNANT NEOPLASM OF BREAST: Primary | ICD-10-CM

## 2023-01-10 ENCOUNTER — TRANSCRIBE ORDER (OUTPATIENT)
Dept: SCHEDULING | Age: 53
End: 2023-01-10

## 2023-01-10 DIAGNOSIS — N63.0 BREAST LUMP: Primary | ICD-10-CM

## 2023-01-28 ENCOUNTER — TRANSCRIBE ORDERS (OUTPATIENT)
Facility: HOSPITAL | Age: 53
End: 2023-01-28

## 2023-01-28 DIAGNOSIS — Z12.31 ENCOUNTER FOR SCREENING MAMMOGRAM FOR MALIGNANT NEOPLASM OF BREAST: Primary | ICD-10-CM
